# Patient Record
Sex: FEMALE | Race: WHITE | Employment: OTHER | URBAN - METROPOLITAN AREA
[De-identification: names, ages, dates, MRNs, and addresses within clinical notes are randomized per-mention and may not be internally consistent; named-entity substitution may affect disease eponyms.]

---

## 2020-10-02 DIAGNOSIS — Z11.59 ENCOUNTER FOR SCREENING FOR OTHER VIRAL DISEASES: Primary | ICD-10-CM

## 2020-10-18 DIAGNOSIS — Z11.59 ENCOUNTER FOR SCREENING FOR OTHER VIRAL DISEASES: ICD-10-CM

## 2020-10-18 PROCEDURE — U0003 INFECTIOUS AGENT DETECTION BY NUCLEIC ACID (DNA OR RNA); SEVERE ACUTE RESPIRATORY SYNDROME CORONAVIRUS 2 (SARS-COV-2) (CORONAVIRUS DISEASE [COVID-19]), AMPLIFIED PROBE TECHNIQUE, MAKING USE OF HIGH THROUGHPUT TECHNOLOGIES AS DESCRIBED BY CMS-2020-01-R: HCPCS | Performed by: ORTHOPAEDIC SURGERY

## 2020-10-19 LAB
SARS-COV-2 RNA SPEC QL NAA+PROBE: NOT DETECTED
SPECIMEN SOURCE: NORMAL

## 2020-10-20 RX ORDER — CHOLECALCIFEROL (VITAMIN D3) 50 MCG
1 TABLET ORAL EVERY MORNING
COMMUNITY

## 2020-10-20 RX ORDER — CARBIDOPA AND LEVODOPA 50; 200 MG/1; MG/1
1 TABLET, EXTENDED RELEASE ORAL AT BEDTIME
COMMUNITY

## 2020-10-20 RX ORDER — NAPROXEN SODIUM 220 MG
220 TABLET ORAL DAILY PRN
Status: ON HOLD | COMMUNITY
End: 2020-10-28

## 2020-10-20 RX ORDER — HYDROCODONE BITARTRATE AND ACETAMINOPHEN 5; 325 MG/1; MG/1
1-2 TABLET ORAL EVERY 6 HOURS PRN
Status: ON HOLD | COMMUNITY
End: 2020-10-21

## 2020-10-20 RX ORDER — PYRIDOXINE HCL (VITAMIN B6) 25 MG
25 TABLET ORAL EVERY MORNING
COMMUNITY

## 2020-10-20 RX ORDER — CARBIDOPA AND LEVODOPA 25; 100 MG/1; MG/1
2.5 TABLET ORAL 2 TIMES DAILY
COMMUNITY

## 2020-10-20 RX ORDER — ENTACAPONE 200 MG/1
200 TABLET ORAL 4 TIMES DAILY
COMMUNITY

## 2020-10-20 RX ORDER — HYDROCHLOROTHIAZIDE 12.5 MG/1
12.5 TABLET ORAL EVERY MORNING
COMMUNITY

## 2020-10-20 RX ORDER — ERGOCALCIFEROL 1.25 MG/1
50000 CAPSULE, LIQUID FILLED ORAL WEEKLY
COMMUNITY

## 2020-10-20 RX ORDER — CARBIDOPA AND LEVODOPA 25; 100 MG/1; MG/1
2 TABLET ORAL 2 TIMES DAILY
COMMUNITY

## 2020-10-20 RX ORDER — ROSUVASTATIN CALCIUM 20 MG/1
20 TABLET, COATED ORAL EVERY EVENING
COMMUNITY

## 2020-10-20 RX ORDER — GABAPENTIN 300 MG/1
300 CAPSULE ORAL 3 TIMES DAILY
COMMUNITY

## 2020-10-20 RX ORDER — ACETAMINOPHEN 500 MG
500-1000 TABLET ORAL EVERY 6 HOURS PRN
Status: ON HOLD | COMMUNITY
End: 2020-10-21

## 2020-10-20 RX ORDER — AMANTADINE 137 MG/1
2 CAPSULE, COATED PELLETS ORAL EVERY EVENING
Status: ON HOLD | COMMUNITY
End: 2020-10-21

## 2020-10-20 RX ORDER — MULTIVITAMIN,THERAPEUTIC
1 TABLET ORAL EVERY MORNING
COMMUNITY

## 2020-10-21 ENCOUNTER — ANESTHESIA (OUTPATIENT)
Dept: SURGERY | Facility: CLINIC | Age: 70
DRG: 908 | End: 2020-10-21
Payer: MEDICARE

## 2020-10-21 ENCOUNTER — HOSPITAL ENCOUNTER (OUTPATIENT)
Facility: CLINIC | Age: 70
Discharge: HOME | DRG: 908 | End: 2020-10-21
Attending: ORTHOPAEDIC SURGERY | Admitting: ORTHOPAEDIC SURGERY
Payer: MEDICARE

## 2020-10-21 ENCOUNTER — APPOINTMENT (OUTPATIENT)
Dept: GENERAL RADIOLOGY | Facility: CLINIC | Age: 70
DRG: 908 | End: 2020-10-21
Attending: ORTHOPAEDIC SURGERY
Payer: MEDICARE

## 2020-10-21 ENCOUNTER — ANESTHESIA EVENT (OUTPATIENT)
Dept: SURGERY | Facility: CLINIC | Age: 70
DRG: 908 | End: 2020-10-21
Payer: MEDICARE

## 2020-10-21 VITALS
RESPIRATION RATE: 20 BRPM | TEMPERATURE: 98 F | WEIGHT: 238.9 LBS | HEART RATE: 89 BPM | OXYGEN SATURATION: 95 % | SYSTOLIC BLOOD PRESSURE: 126 MMHG | HEIGHT: 62 IN | BODY MASS INDEX: 43.96 KG/M2 | DIASTOLIC BLOOD PRESSURE: 88 MMHG

## 2020-10-21 DIAGNOSIS — M54.16 LUMBAR RADICULOPATHY: Primary | ICD-10-CM

## 2020-10-21 LAB — POTASSIUM SERPL-SCNC: 4.2 MMOL/L (ref 3.4–5.3)

## 2020-10-21 PROCEDURE — 360N000030 HC SURGERY LEVEL 4 W FLUORO 1ST 30 MIN: Performed by: ORTHOPAEDIC SURGERY

## 2020-10-21 PROCEDURE — 761N000007 HC RECOVERY PHASE 2 EACH 15 MINS: Performed by: ORTHOPAEDIC SURGERY

## 2020-10-21 PROCEDURE — 84132 ASSAY OF SERUM POTASSIUM: CPT | Performed by: ANESTHESIOLOGY

## 2020-10-21 PROCEDURE — 999N000139 HC STATISTIC PRE-PROCEDURE ASSESSMENT II: Performed by: ORTHOPAEDIC SURGERY

## 2020-10-21 PROCEDURE — 761N000002 HC RECOVERY PHASE 1 LEVEL 1 EA ADDTL HR: Performed by: ORTHOPAEDIC SURGERY

## 2020-10-21 PROCEDURE — 999N000179 XR SURGERY CARM FLUORO LESS THAN 5 MIN W STILLS

## 2020-10-21 PROCEDURE — 370N000002 HC ANESTHESIA TECHNICAL FEE, EACH ADDTL 15 MIN: Performed by: ORTHOPAEDIC SURGERY

## 2020-10-21 PROCEDURE — 250N000009 HC RX 250

## 2020-10-21 PROCEDURE — 272N000001 HC OR GENERAL SUPPLY STERILE: Performed by: ORTHOPAEDIC SURGERY

## 2020-10-21 PROCEDURE — 250N000009 HC RX 250: Performed by: ORTHOPAEDIC SURGERY

## 2020-10-21 PROCEDURE — 250N000003 HC SEVOFLURANE, EA 15 MIN: Performed by: ORTHOPAEDIC SURGERY

## 2020-10-21 PROCEDURE — 250N000013 HC RX MED GY IP 250 OP 250 PS 637: Performed by: ORTHOPAEDIC SURGERY

## 2020-10-21 PROCEDURE — 761N000001 HC RECOVERY PHASE 1 LEVEL 1 FIRST HR: Performed by: ORTHOPAEDIC SURGERY

## 2020-10-21 PROCEDURE — 360N000027 HC SURGERY LEVEL 4 EA 15 ADDTL MIN: Performed by: ORTHOPAEDIC SURGERY

## 2020-10-21 PROCEDURE — 01NB0ZZ RELEASE LUMBAR NERVE, OPEN APPROACH: ICD-10-PCS | Performed by: ORTHOPAEDIC SURGERY

## 2020-10-21 PROCEDURE — 370N000001 HC ANESTHESIA TECHNICAL FEE, 1ST 30 MIN: Performed by: ORTHOPAEDIC SURGERY

## 2020-10-21 PROCEDURE — 250N000011 HC RX IP 250 OP 636

## 2020-10-21 PROCEDURE — 250N000011 HC RX IP 250 OP 636: Performed by: ORTHOPAEDIC SURGERY

## 2020-10-21 PROCEDURE — 36415 COLL VENOUS BLD VENIPUNCTURE: CPT | Performed by: ANESTHESIOLOGY

## 2020-10-21 PROCEDURE — 250N000006 HC OR RX SURGIFLO W/THROMBIN KIT 2ML 1991 OPNP: Performed by: ORTHOPAEDIC SURGERY

## 2020-10-21 PROCEDURE — 258N000003 HC RX IP 258 OP 636

## 2020-10-21 RX ORDER — FENTANYL CITRATE 50 UG/ML
INJECTION, SOLUTION INTRAMUSCULAR; INTRAVENOUS PRN
Status: DISCONTINUED | OUTPATIENT
Start: 2020-10-21 | End: 2020-10-21

## 2020-10-21 RX ORDER — LIDOCAINE HYDROCHLORIDE 20 MG/ML
INJECTION, SOLUTION INFILTRATION; PERINEURAL PRN
Status: DISCONTINUED | OUTPATIENT
Start: 2020-10-21 | End: 2020-10-21

## 2020-10-21 RX ORDER — ACETAMINOPHEN 325 MG/1
975 TABLET ORAL ONCE
Status: COMPLETED | OUTPATIENT
Start: 2020-10-21 | End: 2020-10-21

## 2020-10-21 RX ORDER — ONDANSETRON 4 MG/1
4 TABLET, ORALLY DISINTEGRATING ORAL
Status: DISCONTINUED | OUTPATIENT
Start: 2020-10-21 | End: 2020-10-21 | Stop reason: HOSPADM

## 2020-10-21 RX ORDER — BUPIVACAINE HYDROCHLORIDE AND EPINEPHRINE 5; 5 MG/ML; UG/ML
INJECTION, SOLUTION PERINEURAL PRN
Status: DISCONTINUED | OUTPATIENT
Start: 2020-10-21 | End: 2020-10-21 | Stop reason: HOSPADM

## 2020-10-21 RX ORDER — ONDANSETRON 4 MG/1
4 TABLET, ORALLY DISINTEGRATING ORAL EVERY 30 MIN PRN
Status: DISCONTINUED | OUTPATIENT
Start: 2020-10-21 | End: 2020-10-21 | Stop reason: HOSPADM

## 2020-10-21 RX ORDER — GLYCOPYRROLATE 0.2 MG/ML
INJECTION, SOLUTION INTRAMUSCULAR; INTRAVENOUS PRN
Status: DISCONTINUED | OUTPATIENT
Start: 2020-10-21 | End: 2020-10-21

## 2020-10-21 RX ORDER — MAGNESIUM HYDROXIDE 1200 MG/15ML
LIQUID ORAL PRN
Status: DISCONTINUED | OUTPATIENT
Start: 2020-10-21 | End: 2020-10-21 | Stop reason: HOSPADM

## 2020-10-21 RX ORDER — DEXAMETHASONE SODIUM PHOSPHATE 4 MG/ML
INJECTION, SOLUTION INTRA-ARTICULAR; INTRALESIONAL; INTRAMUSCULAR; INTRAVENOUS; SOFT TISSUE PRN
Status: DISCONTINUED | OUTPATIENT
Start: 2020-10-21 | End: 2020-10-21

## 2020-10-21 RX ORDER — EPHEDRINE SULFATE 50 MG/ML
INJECTION, SOLUTION INTRAMUSCULAR; INTRAVENOUS; SUBCUTANEOUS PRN
Status: DISCONTINUED | OUTPATIENT
Start: 2020-10-21 | End: 2020-10-21

## 2020-10-21 RX ORDER — OXYCODONE HYDROCHLORIDE 5 MG/1
10 TABLET ORAL
Status: COMPLETED | OUTPATIENT
Start: 2020-10-21 | End: 2020-10-21

## 2020-10-21 RX ORDER — SODIUM CHLORIDE, SODIUM LACTATE, POTASSIUM CHLORIDE, CALCIUM CHLORIDE 600; 310; 30; 20 MG/100ML; MG/100ML; MG/100ML; MG/100ML
INJECTION, SOLUTION INTRAVENOUS CONTINUOUS
Status: DISCONTINUED | OUTPATIENT
Start: 2020-10-21 | End: 2020-10-21 | Stop reason: HOSPADM

## 2020-10-21 RX ORDER — CEFAZOLIN SODIUM 2 G/100ML
2 INJECTION, SOLUTION INTRAVENOUS
Status: COMPLETED | OUTPATIENT
Start: 2020-10-21 | End: 2020-10-21

## 2020-10-21 RX ORDER — LIDOCAINE 40 MG/G
CREAM TOPICAL
Status: DISCONTINUED | OUTPATIENT
Start: 2020-10-21 | End: 2020-10-21 | Stop reason: HOSPADM

## 2020-10-21 RX ORDER — SODIUM CHLORIDE, SODIUM LACTATE, POTASSIUM CHLORIDE, CALCIUM CHLORIDE 600; 310; 30; 20 MG/100ML; MG/100ML; MG/100ML; MG/100ML
INJECTION, SOLUTION INTRAVENOUS CONTINUOUS PRN
Status: DISCONTINUED | OUTPATIENT
Start: 2020-10-21 | End: 2020-10-21

## 2020-10-21 RX ORDER — FENTANYL CITRATE 50 UG/ML
25-50 INJECTION, SOLUTION INTRAMUSCULAR; INTRAVENOUS
Status: DISCONTINUED | OUTPATIENT
Start: 2020-10-21 | End: 2020-10-21 | Stop reason: HOSPADM

## 2020-10-21 RX ORDER — PROPOFOL 10 MG/ML
INJECTION, EMULSION INTRAVENOUS CONTINUOUS PRN
Status: DISCONTINUED | OUTPATIENT
Start: 2020-10-21 | End: 2020-10-21

## 2020-10-21 RX ORDER — AMOXICILLIN 250 MG
1 CAPSULE ORAL 2 TIMES DAILY PRN
Qty: 28 TABLET | Refills: 0 | Status: SHIPPED | OUTPATIENT
Start: 2020-10-21 | End: 2020-11-04

## 2020-10-21 RX ORDER — HYDROMORPHONE HYDROCHLORIDE 1 MG/ML
.3-.5 INJECTION, SOLUTION INTRAMUSCULAR; INTRAVENOUS; SUBCUTANEOUS EVERY 5 MIN PRN
Status: DISCONTINUED | OUTPATIENT
Start: 2020-10-21 | End: 2020-10-21 | Stop reason: HOSPADM

## 2020-10-21 RX ORDER — ONDANSETRON 2 MG/ML
4 INJECTION INTRAMUSCULAR; INTRAVENOUS EVERY 30 MIN PRN
Status: DISCONTINUED | OUTPATIENT
Start: 2020-10-21 | End: 2020-10-21 | Stop reason: HOSPADM

## 2020-10-21 RX ORDER — AMANTADINE 137 MG/1
2 CAPSULE, COATED PELLETS ORAL AT BEDTIME
COMMUNITY

## 2020-10-21 RX ORDER — GABAPENTIN 100 MG/1
100 CAPSULE ORAL
Status: COMPLETED | OUTPATIENT
Start: 2020-10-21 | End: 2020-10-21

## 2020-10-21 RX ORDER — PROPOFOL 10 MG/ML
INJECTION, EMULSION INTRAVENOUS PRN
Status: DISCONTINUED | OUTPATIENT
Start: 2020-10-21 | End: 2020-10-21

## 2020-10-21 RX ORDER — NALOXONE HYDROCHLORIDE 0.4 MG/ML
.1-.4 INJECTION, SOLUTION INTRAMUSCULAR; INTRAVENOUS; SUBCUTANEOUS
Status: DISCONTINUED | OUTPATIENT
Start: 2020-10-21 | End: 2020-10-21 | Stop reason: HOSPADM

## 2020-10-21 RX ORDER — CEFAZOLIN SODIUM 1 G/3ML
1 INJECTION, POWDER, FOR SOLUTION INTRAMUSCULAR; INTRAVENOUS SEE ADMIN INSTRUCTIONS
Status: DISCONTINUED | OUTPATIENT
Start: 2020-10-21 | End: 2020-10-21 | Stop reason: HOSPADM

## 2020-10-21 RX ORDER — OXYCODONE HYDROCHLORIDE 5 MG/1
5-10 TABLET ORAL EVERY 4 HOURS PRN
Qty: 20 TABLET | Refills: 0 | Status: ON HOLD | OUTPATIENT
Start: 2020-10-21 | End: 2020-10-28

## 2020-10-21 RX ORDER — ACETAMINOPHEN 325 MG/1
650 TABLET ORAL
Status: DISCONTINUED | OUTPATIENT
Start: 2020-10-21 | End: 2020-10-21 | Stop reason: HOSPADM

## 2020-10-21 RX ADMIN — DEXAMETHASONE SODIUM PHOSPHATE 4 MG: 4 INJECTION, SOLUTION INTRA-ARTICULAR; INTRALESIONAL; INTRAMUSCULAR; INTRAVENOUS; SOFT TISSUE at 07:39

## 2020-10-21 RX ADMIN — Medication 12.5 MG: at 07:36

## 2020-10-21 RX ADMIN — PHENYLEPHRINE HYDROCHLORIDE 0.4 MCG: 10 INJECTION INTRAVENOUS at 08:06

## 2020-10-21 RX ADMIN — PHENYLEPHRINE HYDROCHLORIDE 200 MCG: 10 INJECTION INTRAVENOUS at 07:53

## 2020-10-21 RX ADMIN — FENTANYL CITRATE 50 MCG: 50 INJECTION, SOLUTION INTRAMUSCULAR; INTRAVENOUS at 07:23

## 2020-10-21 RX ADMIN — SUGAMMADEX 400 MG: 100 INJECTION, SOLUTION INTRAVENOUS at 08:47

## 2020-10-21 RX ADMIN — PHENYLEPHRINE HYDROCHLORIDE 100 MCG: 10 INJECTION INTRAVENOUS at 07:44

## 2020-10-21 RX ADMIN — GLYCOPYRROLATE 0.2 MG: 0.2 INJECTION, SOLUTION INTRAMUSCULAR; INTRAVENOUS at 08:25

## 2020-10-21 RX ADMIN — HYDROMORPHONE HYDROCHLORIDE 0.5 MG: 1 INJECTION, SOLUTION INTRAMUSCULAR; INTRAVENOUS; SUBCUTANEOUS at 08:12

## 2020-10-21 RX ADMIN — PROPOFOL 200 MG: 10 INJECTION, EMULSION INTRAVENOUS at 07:23

## 2020-10-21 RX ADMIN — SODIUM CHLORIDE, POTASSIUM CHLORIDE, SODIUM LACTATE AND CALCIUM CHLORIDE: 600; 310; 30; 20 INJECTION, SOLUTION INTRAVENOUS at 07:16

## 2020-10-21 RX ADMIN — CEFAZOLIN SODIUM 2 G: 2 INJECTION, SOLUTION INTRAVENOUS at 07:30

## 2020-10-21 RX ADMIN — ROCURONIUM BROMIDE 50 MG: 10 INJECTION INTRAVENOUS at 07:23

## 2020-10-21 RX ADMIN — PHENYLEPHRINE HYDROCHLORIDE 200 MCG: 10 INJECTION INTRAVENOUS at 08:07

## 2020-10-21 RX ADMIN — GABAPENTIN 100 MG: 100 CAPSULE ORAL at 06:24

## 2020-10-21 RX ADMIN — OXYCODONE HYDROCHLORIDE 5 MG: 5 TABLET ORAL at 10:06

## 2020-10-21 RX ADMIN — ACETAMINOPHEN 975 MG: 325 TABLET, FILM COATED ORAL at 06:24

## 2020-10-21 RX ADMIN — LIDOCAINE HYDROCHLORIDE 60 MG: 20 INJECTION, SOLUTION INFILTRATION; PERINEURAL at 07:23

## 2020-10-21 RX ADMIN — MIDAZOLAM 2 MG: 1 INJECTION INTRAMUSCULAR; INTRAVENOUS at 07:18

## 2020-10-21 RX ADMIN — PROPOFOL 30 MCG/KG/MIN: 10 INJECTION, EMULSION INTRAVENOUS at 07:29

## 2020-10-21 RX ADMIN — ROCURONIUM BROMIDE 10 MG: 10 INJECTION INTRAVENOUS at 08:30

## 2020-10-21 RX ADMIN — PHENYLEPHRINE HYDROCHLORIDE 100 MCG: 10 INJECTION INTRAVENOUS at 07:36

## 2020-10-21 RX ADMIN — ROCURONIUM BROMIDE 10 MG: 10 INJECTION INTRAVENOUS at 08:09

## 2020-10-21 SDOH — HEALTH STABILITY: MENTAL HEALTH: HOW OFTEN DO YOU HAVE A DRINK CONTAINING ALCOHOL?: NEVER

## 2020-10-21 ASSESSMENT — MIFFLIN-ST. JEOR: SCORE: 1566.89

## 2020-10-21 ASSESSMENT — COPD QUESTIONNAIRES: COPD: 0

## 2020-10-21 ASSESSMENT — ENCOUNTER SYMPTOMS: SEIZURES: 0

## 2020-10-21 NOTE — ANESTHESIA PREPROCEDURE EVALUATION
Anesthesia Pre-Procedure Evaluation    Patient: Teagan Rand   MRN: [id number] : 1950          Preoperative Diagnosis: Spondylolisthesis of lumbar region [M43.16]  Other spondylosis with radiculopathy, lumbar region [M47.26]  Stenosis, spinal, lumbar [M48.061]    Procedure(s):  RIGHT LUMBAR 4-5 LAMINOTOMY    Past Medical History:   Diagnosis Date     Anxiety      Benign paroxysmal positional vertigo      Blood in stool      Dysfunction of both eustachian tubes      Dyskinesia      Hair loss      Hyperlipidemia      Hypertension      Imbalance      Livedo reticularis      Mandibular swelling      Muscle cramps      Obese      Onychomycosis     toenail     Osteoporosis      Parkinson's disease (H)      Polyclonal gammopathy      Sacroiliitis (H)      Sensorineural hearing loss      Sinusitis      Sleep apnea     doesn't use cpap     Thyroid disorder      Tongue symptom      Vitamin B6 deficiency      Vitamin D deficiency      Past Surgical History:   Procedure Laterality Date     APPENDECTOMY       CHOLECYSTECTOMY       COLONOSCOPY       GYN SURGERY      hysterectomy     ORTHOPEDIC SURGERY      bilateral knee arthroscopy meniscus repair       Anesthesia Evaluation     .             ROS/MED HX    ENT/Pulmonary:     (+)sleep apnea, doesn't use CPAP , . .   (-) asthma and COPD   Neurologic:     (+)Parkinson's disease    (-) seizures and CVA   Cardiovascular:     (+) Dyslipidemia, hypertension----. : . . . :. .       METS/Exercise Tolerance:     Hematologic:         Musculoskeletal:         GI/Hepatic:         Renal/Genitourinary:         Endo:     (+) thyroid problem Obesity, .      Psychiatric:     (+) psychiatric history anxiety      Infectious Disease:         Malignancy:         Other:                          Physical Exam      Airway   Mallampati: III  TM distance: >3 FB  Neck ROM: full    Dental   (+) caps    Cardiovascular   Rhythm and rate: regular      Pulmonary    breath sounds clear to  "auscultation            Lab Results   Component Value Date    POTASSIUM 4.2 10/21/2020       Preop Vitals  BP Readings from Last 3 Encounters:   10/21/20 (!) 162/80    Pulse Readings from Last 3 Encounters:   No data found for Pulse      Resp Readings from Last 3 Encounters:   10/21/20 16    SpO2 Readings from Last 3 Encounters:   10/21/20 95%      Temp Readings from Last 1 Encounters:   10/21/20 36.3  C (97.4  F) (Oral)    Ht Readings from Last 1 Encounters:   10/21/20 1.575 m (5' 2\")      Wt Readings from Last 1 Encounters:   10/21/20 108.4 kg (238 lb 14.4 oz)    Estimated body mass index is 43.7 kg/m  as calculated from the following:    Height as of this encounter: 1.575 m (5' 2\").    Weight as of this encounter: 108.4 kg (238 lb 14.4 oz).       Anesthesia Plan      History & Physical Review  History and physical reviewed and following examination; no interval change.    ASA Status:  2 .    NPO Status:  > 8 hours    Plan for General   PONV prophylaxis:  Ondansetron (or other 5HT-3) and Dexamethasone or Solumedrol  Additional equipment: Videolaryngoscope        Postoperative Care      Consents  Anesthetic plan, risks, benefits and alternatives discussed with:  Patient..                 Kika Ferrera"

## 2020-10-21 NOTE — OP NOTE
Orthopedic Surgery Operative Report    Patient:   Teagan Rand  MRN:   [id number]   :  1950  Facility: [facility]   Date:  10/21/2020        PREOPERATIVE DIAGNOSIS:    Right L4-5 lateral recess stenosis with L5 radiculopathy    POSTOPERATIVE DIAGNOSIS:    Right L4-5 lateral recess stenosis with L5 radiculopathy    PROCEDURE PERFORMED:    1. Right L4-5 hemilaminotomy  2. Use of operating microscope    SURGEON:    Jose Lr MD    SURGICAL ASSISTANT:    None    ANESTHESIA:  General    FINDINGS:    Severe right L4-5 facet overgrowth with subarticular stenosis.  Multiple adhesions of the ligamenum flavum to the underlying dura    COMPLICATIONS:     None    SPECIMENS:    None    ESTIMATED BLOOD LOSS:  5 cc    IMPLANTS:    None    INDICATION FOR OPERATION:  The patient is a 70-year-old female with a history of Parkinson's disease who recently developed a severe right L5 radiculopathy in the setting of a spondylolisthesis L4-5.  I discussed with her the risks benefits and alternatives of the above procedure as a means to decompress the L5 nerve root and improve her symptoms.  I did notice that in the setting of spondylolisthesis there is the possibility of inducing instability at that site which could ultimately go on to need a fusion, however I did think that there is a reasonable chance of success with a decompression operation alone, which the patient wished to proceed with.    DESCRIPTION OF PROCEDURE:    The patient was met in the preoperative holding area and the operative site was confirmed and marked.  We once again reviewed the risks, benefits, and alternatives of the operation and the patient wished to proceed with the surgery.    The patient was taken back to the operating room and induced under general anesthesia.  The patient was positioned prone on the Reynaldo frame with all bony prominences well padded.  The surgical site was prepped and draped in the usual standard  fashion.    A spinal needle was used for level localization with fluoroscopy.  Once I had localized the appropriate skin incision site for the approach to the operative level, a skin incision was made and dissection carried down to the myofascia.  I created a small opening within the myofascia.  I next introduced the first dilator from the IQcardRTRIRIGA system through this hole in the myofascia, and guided it down to lamina.  I sequentially dilated up to a 22 mm tubular retractor.  I locked the tubular retractor into place, and then once again confirmed radiographically that it was docked at the right L4-5 level.  I cleared off the remaining soft tissue at the base of the dilator, and established my operative landmarks of the spinous process and inferior edge of the cranial lamina, and facet complex laterally.     I next performed a hemilaminotomy using the Midas brisa and Kerrison rongeurs, undercutting the spinous process to decompress centrally, and performed an ipsilateral partial medial facetectomy while taking care to preserve enough pars and facet to minimize the risk of postoperative fracture.  I released the ligamenum flavum from the undersurface of the cranial and caudal lamina, and then the facet ipsilaterally.  I removed most of the detached ligamentum flavum, though there were several sites of adhesions of the ligamentum flavum to the underlying dura around the axilla of the L5 nerve root, which I debulked but did not attempt to remove from the dura as it was not compressive and increased the risk of a dural tear should I have proceeded with removal.  I inspected the neural elements, and resected additional bone from the lateral recess as necessary to ensure complete decompression of the traversing L5 nerve root, following its course until it exited into the foramen below the L5 pedicle.  I used a [place] to feel into the foramen and confirmed there was adequate decompression.    I achieved final  hemostasis of the deep structures with bipolar cautery and Floseal.  No ongoing sites of bleeding were present.  At this point I began to begin to withdraw my tubular retractor very slowly through each tissue layer, taking care to bipolar all active sites of bleeding as I withdrew.  When the tube was removed, there were no remaining active sites of bleeding present.    The deep fascia was closed with a running 0-vicyl suture in a watertight fashion, and the subcutaneous tissues were closed with 2-0 vicryl interrupted sutures.  Dermabond and steri strips were applied.  The patient was transferred off of the operative table and allowed to emerge from anesthesia.  The patient was extubated and transported to PACU in stable condition.     All sponge and needle counts were correct at case conclusion.      POSTOPERATIVE PLAN:  -Activity:    Up with assist  -Weight Bearing Status:  WBAT   -Bracing:   None  -Anticoagulation:   Mobilization only  -Pain control:    PO Rx  -Dressing:    Ok to shower with dressing in place, dressing ok to get wet.  -Diet:     ADAT    -Disposition:    Home from PACU pending medical stability  -Follow up:     2 weeks in my clinic        Jose Lr MD

## 2020-10-21 NOTE — OR NURSING
5mg of oxycodone was given. IV left in place. PNDS met, po per I&O sheet. Pt dressed, up in recliner and transported to Phase 2. Patient continues to work on IS. Patient does have  History of SA but does not use CPAP.

## 2020-10-21 NOTE — ANESTHESIA CARE TRANSFER NOTE
Patient: Teagan Rand    Procedure(s):  RIGHT LUMBAR 4-5 LAMINOTOMY    Diagnosis: Spondylolisthesis of lumbar region [M43.16]  Other spondylosis with radiculopathy, lumbar region [M47.26]  Stenosis, spinal, lumbar [M48.061]  Diagnosis Additional Information: No value filed.    Anesthesia Type:   General     Note:  Airway :Face Mask  Patient transferred to:PACU  Comments: VSS HOB UP 35 DEGREES REPORT GIVEN CARE TRANSFERREDHandoff Report: Identifed the Patient, Identified the Reponsible Provider, Reviewed the pertinent medical history, Discussed the surgical course, Reviewed Intra-OP anesthesia mangement and issues during anesthesia, Set expectations for post-procedure period and Allowed opportunity for questions and acknowledgement of understanding      Vitals: (Last set prior to Anesthesia Care Transfer)    CRNA VITALS  10/21/2020 0832 - 10/21/2020 0902      10/21/2020             Resp Rate (observed):  (!) 1    Resp Rate (set):  10                Electronically Signed By: IRMA Vergara CRNA  October 21, 2020  9:02 AM

## 2020-10-21 NOTE — ANESTHESIA PROCEDURE NOTES
Airway   Date/Time: 10/21/2020 7:27 AM   Patient location during procedure: OR    Staff -   Other Anesthesia Staff: Livan Wilson  Performed By: SREE    Indications and Patient Condition  Indications for airway management: lorena-procedural  Induction type:intravenousMask difficulty assessment: 1 - vent by mask    Final Airway Details  Final airway type: endotracheal airway  Successful airway:ETT - single  Endotracheal Airway Details   ETT size (mm): 7.0  Cuffed: yes  Successful intubation technique: video laryngoscopy  Grade View of Cords: 4  Adjucts: stylet  Measured from: lips  Secured at (cm): 24  Secured with: pink tape  Bite block used: Soft    Post intubation assessment   Placement verified by: capnometry, equal breath sounds and chest rise   Number of attempts at approach: 1  Secured with:pink tape  Ease of procedure: easy  Dentition: Intact and Unchanged

## 2020-10-21 NOTE — DISCHARGE INSTRUCTIONS
Today you were given 975 mg of Tylenol at 6:30 am. The recommended daily maximum dose is 4000 mg.         Care after a Discectomy/Decompression/Laminectomy - Dr. Jose Lr     The following information will help you through your recovery at home.     Pain   - It is normal for you to experience some pain in the area of your incision after your surgery.  Some of your leg pain may go away immediately after your surgery.  Some of the pain will gradually decrease over the next few days or weeks depending on the amount of inflammation present in the nerve.       - Sometimes Dr. Lr will place a steroid preparation on the nerve during surgery.   This may help decrease the nerve inflammation for the first few days after surgery.  If some of your leg pain returns 3 to 5 days after surgery it may be due to the steroid wearing off.  The pain should not be as bad as your pre-op pain and will diminish over a period of weeks.       - You should call Dr. Lr's office if your leg pain returns suddenly and does not improve over 24 hours.       Activity   - You may increase your activity as tolerated; walking is the best form of exercise after spine surgery.     - For six weeks after surgery avoid bending, lifting, and twisting (BLT).  Avoid activities such as vacuuming, raking and shoveling.   - Try to limit your lifting to 10 lbs during the first 2 weeks and then increase to 20-25lbs over the next 6 weeks.   - You may return to work approximately 1- 2 weeks after your surgery if you have a sedentary or desk type job.  If you have a physical job Dr. Lr and his staff will help you determine a return to work plan.     - You may resume sexual activity when you feel ready.  Stop if you have pain.     Driving   - You may drive if you feel strong enough and are not taking any narcotic pain medications.     Incision Site   - It is ok to shower starting the day after surgery.  You may allow the dressing to get wet.  Do not immerse the  incision in water such as (bath, pool, hot tub) for at least 3 weeks.  Do not scrub the incision site while in the shower.     - Your incision is covered with steri-strips (narrow white tapes); they will get loose and fall off in 10-20 days.  If they are still in place 3 weeks after surgery, you may remove them.         Pain Management   - Take your prescribed pain medication as needed and directed.  Use Tylenol and Ibuprofen for your discomfort when you no longer need the narcotic pain medication.     - If you need a refill on your pain medication call [phone], please allow 24 hours for your prescription to be refilled, Dr. Lr's office does not refill pain medications on Friday afternoons.     Diet   - Eat a healthy diet; this will help your recovery.   - Drink plenty of fluids, water, milk or juice.   - If you have trouble with constipation you should eat more fiber, drink more fluids, increase your walking or try an over the counter laxative.     Follow-up Visits   - You should have a post-op appointment that was scheduled for you at the same time your surgery was scheduled. If you do not, please call Dr. Lr's office when you get home from your surgery.   - Write down any questions you have about your surgery, recovery, return to work and other topics you wished to be covered at your post-op visit.  This way, we will be able to address all of your questions at your next visit.   - Call Dr. Lr's office if you have any questions or concerns.     When to Call your Doctor:   - If you have any redness, warmth or swelling at the incision site.   - If your incision opens up.   - If you have increasing drainage from your incision.   - If you have a temperature greater than 100.5 degrees Fahrenheit.          Same Day Surgery Discharge Instructions for  Sedation and General Anesthesia       It's not unusual to feel dizzy, light-headed or faint for up to 24 hours after surgery or while taking pain medication.  If  you have these symptoms: sit for a few minutes before standing and have someone assist you when you get up to walk or use the bathroom.      You should rest and relax for the next 24 hours. We recommend you make arrangements to have an adult stay with you for at least 24 hours after your discharge.  Avoid hazardous and strenuous activity.      DO NOT DRIVE any vehicle or operate mechanical equipment for 24 hours following the end of your surgery.  Even though you may feel normal, your reactions may be affected by the medication you have received.      Do not drink alcoholic beverages for 24 hours following surgery.       Slowly progress to your regular diet as you feel able. It's not unusual to feel nauseated and/or vomit after receiving anesthesia.  If you develop these symptoms, drink clear liquids (apple juice, ginger ale, broth, 7-up, etc. ) until you feel better.  If your nausea and vomiting persists for 24 hours, please notify your surgeon.        All narcotic pain medications, along with inactivity and anesthesia, can cause constipation. Drinking plenty of liquids and increasing fiber intake will help.      For any questions of a medical nature, call your surgeon.      Do not make important decisions for 24 hours.      If you had general anesthesia, you may have a sore throat for a couple of days related to the breathing tube used during surgery.  You may use Cepacol lozenges to help with this discomfort.  If it worsens or if you develop a fever, contact your surgeon.       If you feel your pain is not well managed with the pain medications prescribed by your surgeon, please contact your surgeon's office to let them know so they can address your concerns.           CoVid 19 Information    We want to give you information regarding Covid. Please consult your primary care provider with any questions you might have.     Patient who have symptoms (cough, fever, or shortness of breath), need to isolate for 7 days  from when symptoms started OR 72 hours after fever resolves (without fever reducing medications) AND improvement of respiratory symptoms (whichever is longer).      Isolate yourself at home (in own room/own bathroom if possible)    Do Not allow any visitors    Do Not go to work or school    Do Not go to [employer],  centers, shopping, or other public places.    Do Not shake hands.    Avoid close and intimate contact with others (hugging, kissing).    Follow CDC recommendations for household cleaning of frequently touched services.     After the initial 7 days, continue to isolate yourself from household members as much as possible. To continue decrease the risk of community spread and exposure, you and any members of your household should limit activities in public for 14 days after starting home isolation.     You can reference the following CDC link for helpful home isolation/care tips:  https://www.cdc.gov/coronavirus/2019-ncov/downloads/10Things.pdf    Protect Others:    Cover Your Mouth and Nose with a mask, disposable tissue or wash cloth to avoid spreading germs to others.    Wash your hands and face frequently with soap and water    Call Your Primary Doctor If: Breathing difficulty develops or you become worse.    For more information about COVID19 and options for caring for yourself at home, please visit the CDC website at https://www.cdc.gov/coronavirus/2019-ncov/about/steps-when-sick.html  For more options for care at [facility], please visit our website at [email or web address]        ** If you have questions or concerns about your procedure, call   Dr. Lr at [phone].**

## 2020-10-21 NOTE — ANESTHESIA POSTPROCEDURE EVALUATION
Patient: Teagan Rand    Procedure(s):  RIGHT LUMBAR 4-5 LAMINOTOMY    Diagnosis:Spondylolisthesis of lumbar region [M43.16]  Other spondylosis with radiculopathy, lumbar region [M47.26]  Stenosis, spinal, lumbar [M48.061]  Diagnosis Additional Information: No value filed.    Anesthesia Type:  General    Note:  Anesthesia Post Evaluation    Patient location during evaluation: PACU  Patient participation: Able to fully participate in evaluation  Level of consciousness: awake, awake and alert and responsive to verbal stimuli  Pain management: adequate  Airway patency: patent  Cardiovascular status: acceptable  Respiratory status: acceptable  Hydration status: acceptable  PONV: none     Anesthetic complications: None          Last vitals:  Vitals:    10/21/20 1015 10/21/20 1030 10/21/20 1058   BP: 130/73  126/88   Pulse: 85 83 89   Resp: (!) 7 21 20   Temp:   36.7  C (98  F)   SpO2: 96%  95%         Electronically Signed By: Kika Ferrera  October 21, 2020  2:35 PM

## 2020-10-24 ENCOUNTER — ANESTHESIA (OUTPATIENT)
Dept: SURGERY | Facility: CLINIC | Age: 70
DRG: 908 | End: 2020-10-24
Payer: MEDICARE

## 2020-10-24 ENCOUNTER — ANESTHESIA EVENT (OUTPATIENT)
Dept: SURGERY | Facility: CLINIC | Age: 70
DRG: 908 | End: 2020-10-24
Payer: MEDICARE

## 2020-10-24 ENCOUNTER — HOSPITAL ENCOUNTER (INPATIENT)
Facility: CLINIC | Age: 70
LOS: 4 days | Discharge: NURSING, REHAB OR LONG-TERM CARE | DRG: 908 | End: 2020-10-28
Attending: EMERGENCY MEDICINE | Admitting: ORTHOPAEDIC SURGERY
Payer: MEDICARE

## 2020-10-24 ENCOUNTER — APPOINTMENT (OUTPATIENT)
Dept: MRI IMAGING | Facility: CLINIC | Age: 70
DRG: 908 | End: 2020-10-24
Attending: EMERGENCY MEDICINE
Payer: MEDICARE

## 2020-10-24 DIAGNOSIS — S06.4XAA EPIDURAL HEMATOMA (H): Primary | ICD-10-CM

## 2020-10-24 LAB
ANION GAP SERPL CALCULATED.3IONS-SCNC: 4 MMOL/L (ref 3–14)
APTT PPP: 28 SEC (ref 22–37)
BASOPHILS # BLD AUTO: 0 10E9/L (ref 0–0.2)
BASOPHILS NFR BLD AUTO: 0.2 %
BUN SERPL-MCNC: 17 MG/DL (ref 7–30)
CALCIUM SERPL-MCNC: 9.4 MG/DL (ref 8.5–10.1)
CHLORIDE SERPL-SCNC: 106 MMOL/L (ref 94–109)
CO2 SERPL-SCNC: 26 MMOL/L (ref 20–32)
CREAT SERPL-MCNC: 0.79 MG/DL (ref 0.52–1.04)
DIFFERENTIAL METHOD BLD: NORMAL
EOSINOPHIL # BLD AUTO: 0.1 10E9/L (ref 0–0.7)
EOSINOPHIL NFR BLD AUTO: 1 %
ERYTHROCYTE [DISTWIDTH] IN BLOOD BY AUTOMATED COUNT: 12.7 % (ref 10–15)
ERYTHROCYTE [DISTWIDTH] IN BLOOD BY AUTOMATED COUNT: 12.9 % (ref 10–15)
FIBRINOGEN PPP-MCNC: 418 MG/DL (ref 200–420)
GFR SERPL CREATININE-BSD FRML MDRD: 77 ML/MIN/{1.73_M2}
GLUCOSE SERPL-MCNC: 111 MG/DL (ref 70–99)
HCT VFR BLD AUTO: 37.2 % (ref 35–47)
HCT VFR BLD AUTO: 40.4 % (ref 35–47)
HGB BLD-MCNC: 12.6 G/DL (ref 11.7–15.7)
HGB BLD-MCNC: 13.7 G/DL (ref 11.7–15.7)
IMM GRANULOCYTES # BLD: 0 10E9/L (ref 0–0.4)
IMM GRANULOCYTES NFR BLD: 0.3 %
INR PPP: 0.98 (ref 0.86–1.14)
INR PPP: 1.03 (ref 0.86–1.14)
LABORATORY COMMENT REPORT: NORMAL
LYMPHOCYTES # BLD AUTO: 1.8 10E9/L (ref 0.8–5.3)
LYMPHOCYTES NFR BLD AUTO: 19.3 %
MCH RBC QN AUTO: 32.1 PG (ref 26.5–33)
MCH RBC QN AUTO: 32.2 PG (ref 26.5–33)
MCHC RBC AUTO-ENTMCNC: 33.9 G/DL (ref 31.5–36.5)
MCHC RBC AUTO-ENTMCNC: 33.9 G/DL (ref 31.5–36.5)
MCV RBC AUTO: 95 FL (ref 78–100)
MCV RBC AUTO: 95 FL (ref 78–100)
MONOCYTES # BLD AUTO: 0.8 10E9/L (ref 0–1.3)
MONOCYTES NFR BLD AUTO: 8.7 %
NEUTROPHILS # BLD AUTO: 6.6 10E9/L (ref 1.6–8.3)
NEUTROPHILS NFR BLD AUTO: 70.5 %
NRBC # BLD AUTO: 0 10*3/UL
NRBC BLD AUTO-RTO: 0 /100
PLATELET # BLD AUTO: 206 10E9/L (ref 150–450)
PLATELET # BLD AUTO: 210 10E9/L (ref 150–450)
POTASSIUM SERPL-SCNC: 4.2 MMOL/L (ref 3.4–5.3)
RBC # BLD AUTO: 3.92 10E12/L (ref 3.8–5.2)
RBC # BLD AUTO: 4.25 10E12/L (ref 3.8–5.2)
SARS-COV-2 RNA SPEC QL NAA+PROBE: NEGATIVE
SARS-COV-2 RNA SPEC QL NAA+PROBE: NORMAL
SODIUM SERPL-SCNC: 136 MMOL/L (ref 133–144)
SPECIMEN SOURCE: NORMAL
SPECIMEN SOURCE: NORMAL
WBC # BLD AUTO: 7.3 10E9/L (ref 4–11)
WBC # BLD AUTO: 9.3 10E9/L (ref 4–11)

## 2020-10-24 PROCEDURE — 80329 ANALGESICS NON-OPIOID 1 OR 2: CPT | Performed by: PHYSICIAN ASSISTANT

## 2020-10-24 PROCEDURE — 250N000011 HC RX IP 250 OP 636: Performed by: NURSE ANESTHETIST, CERTIFIED REGISTERED

## 2020-10-24 PROCEDURE — 250N000009 HC RX 250: Performed by: NURSE ANESTHETIST, CERTIFIED REGISTERED

## 2020-10-24 PROCEDURE — 99222 1ST HOSP IP/OBS MODERATE 55: CPT | Performed by: PHYSICIAN ASSISTANT

## 2020-10-24 PROCEDURE — 80048 BASIC METABOLIC PNL TOTAL CA: CPT | Performed by: EMERGENCY MEDICINE

## 2020-10-24 PROCEDURE — 250N000011 HC RX IP 250 OP 636: Performed by: ANESTHESIOLOGY

## 2020-10-24 PROCEDURE — 360N000027 HC SURGERY LEVEL 4 EA 15 ADDTL MIN: Performed by: ORTHOPAEDIC SURGERY

## 2020-10-24 PROCEDURE — 250N000009 HC RX 250: Performed by: ORTHOPAEDIC SURGERY

## 2020-10-24 PROCEDURE — 250N000003 HC SEVOFLURANE, EA 15 MIN: Performed by: ORTHOPAEDIC SURGERY

## 2020-10-24 PROCEDURE — U0003 INFECTIOUS AGENT DETECTION BY NUCLEIC ACID (DNA OR RNA); SEVERE ACUTE RESPIRATORY SYNDROME CORONAVIRUS 2 (SARS-COV-2) (CORONAVIRUS DISEASE [COVID-19]), AMPLIFIED PROBE TECHNIQUE, MAKING USE OF HIGH THROUGHPUT TECHNOLOGIES AS DESCRIBED BY CMS-2020-01-R: HCPCS | Performed by: EMERGENCY MEDICINE

## 2020-10-24 PROCEDURE — 72148 MRI LUMBAR SPINE W/O DYE: CPT

## 2020-10-24 PROCEDURE — 258N000003 HC RX IP 258 OP 636: Performed by: ANESTHESIOLOGY

## 2020-10-24 PROCEDURE — 761N000002 HC RECOVERY PHASE 1 LEVEL 1 EA ADDTL HR: Performed by: ORTHOPAEDIC SURGERY

## 2020-10-24 PROCEDURE — 370N000001 HC ANESTHESIA TECHNICAL FEE, 1ST 30 MIN: Performed by: ORTHOPAEDIC SURGERY

## 2020-10-24 PROCEDURE — 99207 PR CONSULT E&M CHANGED TO INITIAL LEVEL: CPT | Performed by: PHYSICIAN ASSISTANT

## 2020-10-24 PROCEDURE — 250N000013 HC RX MED GY IP 250 OP 250 PS 637: Performed by: ORTHOPAEDIC SURGERY

## 2020-10-24 PROCEDURE — 01NB0ZZ RELEASE LUMBAR NERVE, OPEN APPROACH: ICD-10-PCS | Performed by: ORTHOPAEDIC SURGERY

## 2020-10-24 PROCEDURE — 250N000011 HC RX IP 250 OP 636: Performed by: ORTHOPAEDIC SURGERY

## 2020-10-24 PROCEDURE — 96376 TX/PRO/DX INJ SAME DRUG ADON: CPT

## 2020-10-24 PROCEDURE — 00UT0KZ SUPPLEMENT SPINAL MENINGES WITH NONAUTOLOGOUS TISSUE SUBSTITUTE, OPEN APPROACH: ICD-10-PCS | Performed by: ORTHOPAEDIC SURGERY

## 2020-10-24 PROCEDURE — 85730 THROMBOPLASTIN TIME PARTIAL: CPT | Performed by: ANESTHESIOLOGY

## 2020-10-24 PROCEDURE — 85610 PROTHROMBIN TIME: CPT | Performed by: ANESTHESIOLOGY

## 2020-10-24 PROCEDURE — 250N000011 HC RX IP 250 OP 636: Performed by: EMERGENCY MEDICINE

## 2020-10-24 PROCEDURE — C9803 HOPD COVID-19 SPEC COLLECT: HCPCS

## 2020-10-24 PROCEDURE — 36415 COLL VENOUS BLD VENIPUNCTURE: CPT | Performed by: PHYSICIAN ASSISTANT

## 2020-10-24 PROCEDURE — 96374 THER/PROPH/DIAG INJ IV PUSH: CPT | Mod: 59

## 2020-10-24 PROCEDURE — 99285 EMERGENCY DEPT VISIT HI MDM: CPT | Mod: 25

## 2020-10-24 PROCEDURE — 85025 COMPLETE CBC W/AUTO DIFF WBC: CPT | Performed by: EMERGENCY MEDICINE

## 2020-10-24 PROCEDURE — 85610 PROTHROMBIN TIME: CPT | Performed by: EMERGENCY MEDICINE

## 2020-10-24 PROCEDURE — 258N000003 HC RX IP 258 OP 636: Performed by: ORTHOPAEDIC SURGERY

## 2020-10-24 PROCEDURE — 85384 FIBRINOGEN ACTIVITY: CPT | Performed by: ANESTHESIOLOGY

## 2020-10-24 PROCEDURE — 250N000013 HC RX MED GY IP 250 OP 250 PS 637: Performed by: ANESTHESIOLOGY

## 2020-10-24 PROCEDURE — C1763 CONN TISS, NON-HUMAN: HCPCS | Performed by: ORTHOPAEDIC SURGERY

## 2020-10-24 PROCEDURE — 272N000001 HC OR GENERAL SUPPLY STERILE: Performed by: ORTHOPAEDIC SURGERY

## 2020-10-24 PROCEDURE — 85027 COMPLETE CBC AUTOMATED: CPT | Performed by: ANESTHESIOLOGY

## 2020-10-24 PROCEDURE — 258N000003 HC RX IP 258 OP 636: Performed by: NURSE ANESTHETIST, CERTIFIED REGISTERED

## 2020-10-24 PROCEDURE — 999N000139 HC STATISTIC PRE-PROCEDURE ASSESSMENT II: Performed by: ORTHOPAEDIC SURGERY

## 2020-10-24 PROCEDURE — 761N000001 HC RECOVERY PHASE 1 LEVEL 1 FIRST HR: Performed by: ORTHOPAEDIC SURGERY

## 2020-10-24 PROCEDURE — 120N000001 HC R&B MED SURG/OB

## 2020-10-24 PROCEDURE — 250N000006 HC OR RX SURGIFLO W/THROMBIN KIT 2ML 1991 OPNP: Performed by: ORTHOPAEDIC SURGERY

## 2020-10-24 PROCEDURE — 360N000030 HC SURGERY LEVEL 4 W FLUORO 1ST 30 MIN: Performed by: ORTHOPAEDIC SURGERY

## 2020-10-24 PROCEDURE — 370N000002 HC ANESTHESIA TECHNICAL FEE, EACH ADDTL 15 MIN: Performed by: ORTHOPAEDIC SURGERY

## 2020-10-24 PROCEDURE — 96375 TX/PRO/DX INJ NEW DRUG ADDON: CPT

## 2020-10-24 PROCEDURE — 009U0ZZ DRAINAGE OF SPINAL CANAL, OPEN APPROACH: ICD-10-PCS | Performed by: ORTHOPAEDIC SURGERY

## 2020-10-24 DEVICE — GRAFT DURAGEN 2X2" ID220: Type: IMPLANTABLE DEVICE | Site: SPINE LUMBAR | Status: FUNCTIONAL

## 2020-10-24 RX ORDER — ACETAMINOPHEN 325 MG/1
650 TABLET ORAL EVERY 4 HOURS PRN
Status: DISCONTINUED | OUTPATIENT
Start: 2020-10-27 | End: 2020-10-28 | Stop reason: HOSPADM

## 2020-10-24 RX ORDER — PROCHLORPERAZINE MALEATE 5 MG
5 TABLET ORAL EVERY 6 HOURS PRN
Status: DISCONTINUED | OUTPATIENT
Start: 2020-10-24 | End: 2020-10-24

## 2020-10-24 RX ORDER — LIDOCAINE HYDROCHLORIDE 20 MG/ML
INJECTION, SOLUTION INFILTRATION; PERINEURAL PRN
Status: DISCONTINUED | OUTPATIENT
Start: 2020-10-24 | End: 2020-10-24

## 2020-10-24 RX ORDER — PYRIDOXINE HCL (VITAMIN B6) 25 MG
25 TABLET ORAL EVERY MORNING
Status: DISCONTINUED | OUTPATIENT
Start: 2020-10-25 | End: 2020-10-28 | Stop reason: HOSPADM

## 2020-10-24 RX ORDER — NALOXONE HYDROCHLORIDE 0.4 MG/ML
.1-.4 INJECTION, SOLUTION INTRAMUSCULAR; INTRAVENOUS; SUBCUTANEOUS
Status: DISCONTINUED | OUTPATIENT
Start: 2020-10-24 | End: 2020-10-24

## 2020-10-24 RX ORDER — PROPOFOL 10 MG/ML
INJECTION, EMULSION INTRAVENOUS PRN
Status: DISCONTINUED | OUTPATIENT
Start: 2020-10-24 | End: 2020-10-24

## 2020-10-24 RX ORDER — SODIUM CHLORIDE, SODIUM LACTATE, POTASSIUM CHLORIDE, CALCIUM CHLORIDE 600; 310; 30; 20 MG/100ML; MG/100ML; MG/100ML; MG/100ML
INJECTION, SOLUTION INTRAVENOUS CONTINUOUS
Status: DISCONTINUED | OUTPATIENT
Start: 2020-10-24 | End: 2020-10-24 | Stop reason: HOSPADM

## 2020-10-24 RX ORDER — CARBIDOPA AND LEVODOPA 50; 200 MG/1; MG/1
1 TABLET, EXTENDED RELEASE ORAL AT BEDTIME
Status: DISCONTINUED | OUTPATIENT
Start: 2020-10-25 | End: 2020-10-28 | Stop reason: HOSPADM

## 2020-10-24 RX ORDER — FENTANYL CITRATE 50 UG/ML
INJECTION, SOLUTION INTRAMUSCULAR; INTRAVENOUS PRN
Status: DISCONTINUED | OUTPATIENT
Start: 2020-10-24 | End: 2020-10-24

## 2020-10-24 RX ORDER — ONDANSETRON 4 MG/1
4 TABLET, ORALLY DISINTEGRATING ORAL EVERY 30 MIN PRN
Status: DISCONTINUED | OUTPATIENT
Start: 2020-10-24 | End: 2020-10-24 | Stop reason: HOSPADM

## 2020-10-24 RX ORDER — ENTACAPONE 200 MG/1
200 TABLET ORAL ONCE
Status: COMPLETED | OUTPATIENT
Start: 2020-10-24 | End: 2020-10-24

## 2020-10-24 RX ORDER — HYDROMORPHONE HYDROCHLORIDE 1 MG/ML
.5-1 INJECTION, SOLUTION INTRAMUSCULAR; INTRAVENOUS; SUBCUTANEOUS
Status: DISCONTINUED | OUTPATIENT
Start: 2020-10-24 | End: 2020-10-24

## 2020-10-24 RX ORDER — CEFAZOLIN SODIUM 2 G/100ML
2 INJECTION, SOLUTION INTRAVENOUS
Status: COMPLETED | OUTPATIENT
Start: 2020-10-24 | End: 2020-10-24

## 2020-10-24 RX ORDER — FENTANYL CITRATE-0.9 % NACL/PF 10 MCG/ML
PLASTIC BAG, INJECTION (ML) INTRAVENOUS CONTINUOUS PRN
Status: DISCONTINUED | OUTPATIENT
Start: 2020-10-24 | End: 2020-10-24

## 2020-10-24 RX ORDER — MORPHINE SULFATE 4 MG/ML
4 INJECTION, SOLUTION INTRAMUSCULAR; INTRAVENOUS
Status: DISCONTINUED | OUTPATIENT
Start: 2020-10-24 | End: 2020-10-24

## 2020-10-24 RX ORDER — HYDROXYZINE HYDROCHLORIDE 10 MG/1
10 TABLET, FILM COATED ORAL EVERY 6 HOURS PRN
Status: DISCONTINUED | OUTPATIENT
Start: 2020-10-24 | End: 2020-10-28 | Stop reason: HOSPADM

## 2020-10-24 RX ORDER — ONDANSETRON 4 MG/1
4 TABLET, ORALLY DISINTEGRATING ORAL EVERY 6 HOURS PRN
Status: DISCONTINUED | OUTPATIENT
Start: 2020-10-24 | End: 2020-10-28 | Stop reason: HOSPADM

## 2020-10-24 RX ORDER — ROSUVASTATIN CALCIUM 20 MG/1
20 TABLET, COATED ORAL EVERY EVENING
Status: DISCONTINUED | OUTPATIENT
Start: 2020-10-24 | End: 2020-10-28 | Stop reason: HOSPADM

## 2020-10-24 RX ORDER — FENTANYL CITRATE 50 UG/ML
25-50 INJECTION, SOLUTION INTRAMUSCULAR; INTRAVENOUS EVERY 5 MIN PRN
Status: DISCONTINUED | OUTPATIENT
Start: 2020-10-24 | End: 2020-10-24 | Stop reason: HOSPADM

## 2020-10-24 RX ORDER — HYDROMORPHONE HYDROCHLORIDE 1 MG/ML
.3-.5 INJECTION, SOLUTION INTRAMUSCULAR; INTRAVENOUS; SUBCUTANEOUS EVERY 5 MIN PRN
Status: DISCONTINUED | OUTPATIENT
Start: 2020-10-24 | End: 2020-10-24 | Stop reason: HOSPADM

## 2020-10-24 RX ORDER — AMOXICILLIN 250 MG
1 CAPSULE ORAL 2 TIMES DAILY
Status: DISCONTINUED | OUTPATIENT
Start: 2020-10-24 | End: 2020-10-28 | Stop reason: HOSPADM

## 2020-10-24 RX ORDER — CARBIDOPA/LEVODOPA 25MG-250MG
2 TABLET ORAL ONCE
Status: COMPLETED | OUTPATIENT
Start: 2020-10-24 | End: 2020-10-24

## 2020-10-24 RX ORDER — ONDANSETRON 2 MG/ML
4 INJECTION INTRAMUSCULAR; INTRAVENOUS EVERY 30 MIN PRN
Status: DISCONTINUED | OUTPATIENT
Start: 2020-10-24 | End: 2020-10-24

## 2020-10-24 RX ORDER — MAGNESIUM HYDROXIDE 1200 MG/15ML
LIQUID ORAL PRN
Status: DISCONTINUED | OUTPATIENT
Start: 2020-10-24 | End: 2020-10-24 | Stop reason: HOSPADM

## 2020-10-24 RX ORDER — SODIUM CHLORIDE, SODIUM LACTATE, POTASSIUM CHLORIDE, CALCIUM CHLORIDE 600; 310; 30; 20 MG/100ML; MG/100ML; MG/100ML; MG/100ML
INJECTION, SOLUTION INTRAVENOUS CONTINUOUS PRN
Status: DISCONTINUED | OUTPATIENT
Start: 2020-10-24 | End: 2020-10-24

## 2020-10-24 RX ORDER — OXYCODONE HYDROCHLORIDE 5 MG/1
5-10 TABLET ORAL EVERY 4 HOURS PRN
Status: DISCONTINUED | OUTPATIENT
Start: 2020-10-24 | End: 2020-10-28 | Stop reason: HOSPADM

## 2020-10-24 RX ORDER — CARBIDOPA AND LEVODOPA 25; 100 MG/1; MG/1
2 TABLET ORAL EVERY 12 HOURS SCHEDULED
Status: DISCONTINUED | OUTPATIENT
Start: 2020-10-25 | End: 2020-10-28 | Stop reason: HOSPADM

## 2020-10-24 RX ORDER — ONDANSETRON 2 MG/ML
4 INJECTION INTRAMUSCULAR; INTRAVENOUS EVERY 6 HOURS PRN
Status: DISCONTINUED | OUTPATIENT
Start: 2020-10-24 | End: 2020-10-28 | Stop reason: HOSPADM

## 2020-10-24 RX ORDER — EPHEDRINE SULFATE 50 MG/ML
INJECTION, SOLUTION INTRAMUSCULAR; INTRAVENOUS; SUBCUTANEOUS PRN
Status: DISCONTINUED | OUTPATIENT
Start: 2020-10-24 | End: 2020-10-24

## 2020-10-24 RX ORDER — AMOXICILLIN 250 MG
2 CAPSULE ORAL 2 TIMES DAILY
Status: DISCONTINUED | OUTPATIENT
Start: 2020-10-24 | End: 2020-10-28 | Stop reason: HOSPADM

## 2020-10-24 RX ORDER — NALOXONE HYDROCHLORIDE 0.4 MG/ML
.1-.4 INJECTION, SOLUTION INTRAMUSCULAR; INTRAVENOUS; SUBCUTANEOUS
Status: DISCONTINUED | OUTPATIENT
Start: 2020-10-24 | End: 2020-10-28 | Stop reason: HOSPADM

## 2020-10-24 RX ORDER — HYDROMORPHONE HYDROCHLORIDE 1 MG/ML
0.3 INJECTION, SOLUTION INTRAMUSCULAR; INTRAVENOUS; SUBCUTANEOUS
Status: DISCONTINUED | OUTPATIENT
Start: 2020-10-24 | End: 2020-10-28 | Stop reason: HOSPADM

## 2020-10-24 RX ORDER — ONDANSETRON 2 MG/ML
INJECTION INTRAMUSCULAR; INTRAVENOUS PRN
Status: DISCONTINUED | OUTPATIENT
Start: 2020-10-24 | End: 2020-10-24

## 2020-10-24 RX ORDER — ACETAMINOPHEN 325 MG/1
975 TABLET ORAL EVERY 8 HOURS
Status: COMPLETED | OUTPATIENT
Start: 2020-10-24 | End: 2020-10-27

## 2020-10-24 RX ORDER — CEFAZOLIN SODIUM 1 G/3ML
1 INJECTION, POWDER, FOR SOLUTION INTRAMUSCULAR; INTRAVENOUS EVERY 8 HOURS
Status: COMPLETED | OUTPATIENT
Start: 2020-10-25 | End: 2020-10-25

## 2020-10-24 RX ORDER — CEFAZOLIN SODIUM 1 G/3ML
1 INJECTION, POWDER, FOR SOLUTION INTRAMUSCULAR; INTRAVENOUS SEE ADMIN INSTRUCTIONS
Status: DISCONTINUED | OUTPATIENT
Start: 2020-10-24 | End: 2020-10-24 | Stop reason: HOSPADM

## 2020-10-24 RX ORDER — ENTACAPONE 200 MG/1
200 TABLET ORAL 4 TIMES DAILY
Status: DISCONTINUED | OUTPATIENT
Start: 2020-10-24 | End: 2020-10-28 | Stop reason: HOSPADM

## 2020-10-24 RX ORDER — GABAPENTIN 300 MG/1
300 CAPSULE ORAL 3 TIMES DAILY
Status: DISCONTINUED | OUTPATIENT
Start: 2020-10-25 | End: 2020-10-28 | Stop reason: HOSPADM

## 2020-10-24 RX ORDER — LABETALOL HYDROCHLORIDE 5 MG/ML
10 INJECTION, SOLUTION INTRAVENOUS
Status: DISCONTINUED | OUTPATIENT
Start: 2020-10-24 | End: 2020-10-24 | Stop reason: HOSPADM

## 2020-10-24 RX ORDER — ONDANSETRON 2 MG/ML
4 INJECTION INTRAMUSCULAR; INTRAVENOUS EVERY 30 MIN PRN
Status: DISCONTINUED | OUTPATIENT
Start: 2020-10-24 | End: 2020-10-24 | Stop reason: HOSPADM

## 2020-10-24 RX ORDER — HYDROCHLOROTHIAZIDE 12.5 MG/1
12.5 CAPSULE ORAL EVERY MORNING
Status: DISCONTINUED | OUTPATIENT
Start: 2020-10-25 | End: 2020-10-28 | Stop reason: HOSPADM

## 2020-10-24 RX ORDER — CARBIDOPA AND LEVODOPA 25; 100 MG/1; MG/1
2 TABLET ORAL ONCE
Status: COMPLETED | OUTPATIENT
Start: 2020-10-24 | End: 2020-10-24

## 2020-10-24 RX ORDER — LIDOCAINE 40 MG/G
CREAM TOPICAL
Status: DISCONTINUED | OUTPATIENT
Start: 2020-10-24 | End: 2020-10-28 | Stop reason: HOSPADM

## 2020-10-24 RX ADMIN — PHENYLEPHRINE HYDROCHLORIDE 100 MCG: 10 INJECTION INTRAVENOUS at 15:15

## 2020-10-24 RX ADMIN — CEFAZOLIN SODIUM 1 G: 2 INJECTION, SOLUTION INTRAVENOUS at 18:25

## 2020-10-24 RX ADMIN — PHENYLEPHRINE HYDROCHLORIDE 100 MCG: 10 INJECTION INTRAVENOUS at 16:13

## 2020-10-24 RX ADMIN — Medication 5 MG: at 15:30

## 2020-10-24 RX ADMIN — ROCURONIUM BROMIDE 10 MG: 10 INJECTION INTRAVENOUS at 17:02

## 2020-10-24 RX ADMIN — HYDROMORPHONE HYDROCHLORIDE 0.3 MG: 1 INJECTION, SOLUTION INTRAMUSCULAR; INTRAVENOUS; SUBCUTANEOUS at 22:10

## 2020-10-24 RX ADMIN — Medication 5 MG: at 15:36

## 2020-10-24 RX ADMIN — MIDAZOLAM 1 MG: 1 INJECTION INTRAMUSCULAR; INTRAVENOUS at 14:08

## 2020-10-24 RX ADMIN — HYDROMORPHONE HYDROCHLORIDE 1 MG: 1 INJECTION, SOLUTION INTRAMUSCULAR; INTRAVENOUS; SUBCUTANEOUS at 10:40

## 2020-10-24 RX ADMIN — PHENYLEPHRINE HYDROCHLORIDE 100 MCG: 10 INJECTION INTRAVENOUS at 15:36

## 2020-10-24 RX ADMIN — CARBIDOPA AND LEVODOPA 2 TABLET: 25; 250 TABLET ORAL at 20:46

## 2020-10-24 RX ADMIN — HYDROMORPHONE HYDROCHLORIDE 0.5 MG: 1 INJECTION, SOLUTION INTRAMUSCULAR; INTRAVENOUS; SUBCUTANEOUS at 20:04

## 2020-10-24 RX ADMIN — HYDROMORPHONE HYDROCHLORIDE 0.5 MG: 1 INJECTION, SOLUTION INTRAMUSCULAR; INTRAVENOUS; SUBCUTANEOUS at 19:00

## 2020-10-24 RX ADMIN — CEFAZOLIN SODIUM 1 G: 2 INJECTION, SOLUTION INTRAVENOUS at 16:25

## 2020-10-24 RX ADMIN — SODIUM CHLORIDE, POTASSIUM CHLORIDE, SODIUM LACTATE AND CALCIUM CHLORIDE: 600; 310; 30; 20 INJECTION, SOLUTION INTRAVENOUS at 14:01

## 2020-10-24 RX ADMIN — Medication 5 MG: at 14:48

## 2020-10-24 RX ADMIN — FENTANYL CITRATE 50 MCG: 50 INJECTION, SOLUTION INTRAMUSCULAR; INTRAVENOUS at 14:13

## 2020-10-24 RX ADMIN — ROSUVASTATIN CALCIUM 20 MG: 20 TABLET, FILM COATED ORAL at 22:48

## 2020-10-24 RX ADMIN — CARBIDOPA AND LEVODOPA 2 TABLET: 25; 100 TABLET ORAL at 13:52

## 2020-10-24 RX ADMIN — SODIUM CHLORIDE, POTASSIUM CHLORIDE, SODIUM LACTATE AND CALCIUM CHLORIDE: 600; 310; 30; 20 INJECTION, SOLUTION INTRAVENOUS at 18:53

## 2020-10-24 RX ADMIN — ENTACAPONE 200 MG: 200 TABLET, FILM COATED ORAL at 20:46

## 2020-10-24 RX ADMIN — SODIUM CHLORIDE, POTASSIUM CHLORIDE, SODIUM LACTATE AND CALCIUM CHLORIDE: 600; 310; 30; 20 INJECTION, SOLUTION INTRAVENOUS at 14:07

## 2020-10-24 RX ADMIN — PHENYLEPHRINE HYDROCHLORIDE 100 MCG: 10 INJECTION INTRAVENOUS at 15:19

## 2020-10-24 RX ADMIN — HYDROMORPHONE HYDROCHLORIDE 0.5 MG: 1 INJECTION, SOLUTION INTRAMUSCULAR; INTRAVENOUS; SUBCUTANEOUS at 11:45

## 2020-10-24 RX ADMIN — SUGAMMADEX 200 MG: 100 INJECTION, SOLUTION INTRAVENOUS at 19:32

## 2020-10-24 RX ADMIN — FENTANYL CITRATE 50 MCG: 0.05 INJECTION, SOLUTION INTRAMUSCULAR; INTRAVENOUS at 20:44

## 2020-10-24 RX ADMIN — FENTANYL CITRATE 50 MCG: 50 INJECTION, SOLUTION INTRAMUSCULAR; INTRAVENOUS at 14:31

## 2020-10-24 RX ADMIN — DOCUSATE SODIUM 50 MG AND SENNOSIDES 8.6 MG 1 TABLET: 8.6; 5 TABLET, FILM COATED ORAL at 22:47

## 2020-10-24 RX ADMIN — PHENYLEPHRINE HYDROCHLORIDE 100 MCG: 10 INJECTION INTRAVENOUS at 14:36

## 2020-10-24 RX ADMIN — Medication 5 MG: at 15:49

## 2020-10-24 RX ADMIN — TRANEXAMIC ACID 1070 MG: 100 INJECTION, SOLUTION INTRAVENOUS at 15:44

## 2020-10-24 RX ADMIN — PHENYLEPHRINE HYDROCHLORIDE 100 MCG: 10 INJECTION INTRAVENOUS at 14:48

## 2020-10-24 RX ADMIN — CARBIDOPA AND LEVODOPA 1 HALF-TAB: 25; 100 TABLET ORAL at 13:53

## 2020-10-24 RX ADMIN — LIDOCAINE HYDROCHLORIDE 100 MG: 20 INJECTION, SOLUTION INFILTRATION; PERINEURAL at 14:13

## 2020-10-24 RX ADMIN — TRANEXAMIC ACID 1070 MG: 100 INJECTION, SOLUTION INTRAVENOUS at 18:55

## 2020-10-24 RX ADMIN — Medication 5 MG: at 14:36

## 2020-10-24 RX ADMIN — ENTACAPONE 200 MG: 200 TABLET, FILM COATED ORAL at 13:54

## 2020-10-24 RX ADMIN — PHENYLEPHRINE HYDROCHLORIDE 100 MCG: 10 INJECTION INTRAVENOUS at 15:30

## 2020-10-24 RX ADMIN — Medication 0.25 MCG/MIN: at 16:13

## 2020-10-24 RX ADMIN — PHENYLEPHRINE HYDROCHLORIDE 100 MCG: 10 INJECTION INTRAVENOUS at 15:03

## 2020-10-24 RX ADMIN — PROPOFOL 180 MG: 10 INJECTION, EMULSION INTRAVENOUS at 14:13

## 2020-10-24 RX ADMIN — PHENYLEPHRINE HYDROCHLORIDE 100 MCG: 10 INJECTION INTRAVENOUS at 15:49

## 2020-10-24 RX ADMIN — HYDROMORPHONE HYDROCHLORIDE 0.5 MG: 1 INJECTION, SOLUTION INTRAMUSCULAR; INTRAVENOUS; SUBCUTANEOUS at 20:54

## 2020-10-24 RX ADMIN — ONDANSETRON 4 MG: 2 INJECTION INTRAMUSCULAR; INTRAVENOUS at 10:40

## 2020-10-24 RX ADMIN — ROCURONIUM BROMIDE 50 MG: 10 INJECTION INTRAVENOUS at 14:13

## 2020-10-24 RX ADMIN — Medication 5 MG: at 15:43

## 2020-10-24 RX ADMIN — PHENYLEPHRINE HYDROCHLORIDE 50 MCG: 10 INJECTION INTRAVENOUS at 16:07

## 2020-10-24 RX ADMIN — CEFAZOLIN SODIUM 2 G: 2 INJECTION, SOLUTION INTRAVENOUS at 14:25

## 2020-10-24 RX ADMIN — ROCURONIUM BROMIDE 30 MG: 10 INJECTION INTRAVENOUS at 15:30

## 2020-10-24 RX ADMIN — OXYCODONE HYDROCHLORIDE 5 MG: 5 TABLET ORAL at 22:48

## 2020-10-24 RX ADMIN — Medication 5 MG: at 15:03

## 2020-10-24 RX ADMIN — PHENYLEPHRINE HYDROCHLORIDE 100 MCG: 10 INJECTION INTRAVENOUS at 17:13

## 2020-10-24 RX ADMIN — Medication 5 MG: at 15:15

## 2020-10-24 RX ADMIN — ROCURONIUM BROMIDE 10 MG: 10 INJECTION INTRAVENOUS at 18:21

## 2020-10-24 RX ADMIN — SODIUM CHLORIDE, POTASSIUM CHLORIDE, SODIUM LACTATE AND CALCIUM CHLORIDE: 600; 310; 30; 20 INJECTION, SOLUTION INTRAVENOUS at 15:17

## 2020-10-24 RX ADMIN — PHENYLEPHRINE HYDROCHLORIDE 100 MCG: 10 INJECTION INTRAVENOUS at 15:43

## 2020-10-24 RX ADMIN — ACETAMINOPHEN 975 MG: 325 TABLET, FILM COATED ORAL at 22:47

## 2020-10-24 RX ADMIN — ONDANSETRON 4 MG: 2 INJECTION INTRAMUSCULAR; INTRAVENOUS at 18:57

## 2020-10-24 RX ADMIN — PHENYLEPHRINE HYDROCHLORIDE 100 MCG: 10 INJECTION INTRAVENOUS at 16:01

## 2020-10-24 ASSESSMENT — ENCOUNTER SYMPTOMS
ROS GI COMMENTS: NO LOSS OF BOWEL CONTROL
NAUSEA: 0
ABDOMINAL PAIN: 0
FEVER: 0
COUGH: 0
VOMITING: 0
BACK PAIN: 1

## 2020-10-24 ASSESSMENT — MIFFLIN-ST. JEOR
SCORE: 1676.2
SCORE: 1676.2

## 2020-10-24 NOTE — PROGRESS NOTES
I was notified this morning by the [facility] urgent care line that the patient in the early hours this morning had developed severe bilateral leg pain, after having some hamstring cramping yesterday during the day.  Prior to this she had been recovering well from her laminotomy on 10/21, with resolution of her preoperative radiculopathy and minimal leg pain.    I called and talked with the patient at that time noted significant pain in the bilateral lower extremities radiating down from the back, and also subjective weakness in the legs.  Pain did not follow any specific dermatomal distribution of the legs.  Denied any saddle anesthesia.  Did note what sounds like some overflow incontinence.    I advised the patient to proceed to the [facility] emergency department emergently.  She called an ambulance and was transported here.        On my examination, the patient reports that she has mildly decreased sensation across the right anterolateral shin and anterior knee, and dorsal foot.  Sensation intact fully the left lower extremity.  Reports intact sensation to the genitals and anal region.    Exam:      Spine:   Skin: intact over lumbar spine without evidence of infection    Sensation:      R       L    L3:   Intact   Intact    L4:   Decreased Intact    L5:   Decreased Intact    S1:   Intact   Intact     Motor:     R L    L3: Psoas    4  4    L4:   Quad    4  4    L4/5: TA   5 5    L5: EHL    5  5    S1: Eversion/PF  5  5         Bladder scan: 600cc        Assessment: 3 days status post laminotomy right L4-5 now with symptoms and signs concerning for acute epidural hematoma leading to cauda equina syndrome.      Plan:  - STAT MRI L spine T2-sequence only to determine the extent of any hematoma to evaluate if any additional levels need to be decompressed    -Anticipate proceeding to the operating room for hematoma evacuation as soon as the MRI is completed    -N.p.o. for OR    -Place Wolf Lr,  MD  Orthopaedic Spine Surgery

## 2020-10-24 NOTE — ED NOTES
Bed: ED19  Expected date: 10/24/20  Expected time: 10:06 AM  Means of arrival: Ambulance  Comments:  [facility] 70f post op back pain ETA 1017

## 2020-10-24 NOTE — PROGRESS NOTES
MRI complete, demonstrates epidural hematoma L2-5.    Will proceed to OR for laminotomies L2-4, evacuation of epidural hematoma L2-5.      Jose Lr MD  Orthopaedic Spine Surgery

## 2020-10-24 NOTE — ED PROVIDER NOTES
History     Chief Complaint:  Back Pain    The history is provided by the patient.      Teagan Rand is a 70 year old female diagnosed with Parkinson's disease, anxiety, osteoporosis amongst others as noted below, not currently anticoagulated, who presents alone via EMS for evaluation of bilateral low back pain with radiation down both legs in the setting of undergoing a right L4-5 laminotomy two days ago by Dr. Lr. Patient reports the following day after surgery, she was doing well, but yesterday began to have some difficulty ambulating secondary to pain. She states this morning, she was unable to walk due to severe pain, despite taking two doses of Oxycodone at 0800, and was incontinent of urine, thus called her surgeon, who recommended patient present to the ED for an MRI. EMS was called.     En route, EMS provided patient 100 mcg of Fentanyl.     Here, patient reports no loss of bowel control and states her spouse and her sister-in-law, who is a retired nurse, has been monitoring her incisions and state they are healing well. She denies any abdominal pain, fever, cough, nausea or vomiting.     Allergies:  No Known Drug Allergies     Medications:    Gocovri  Sinemet  Comtan  Neurontin  Hydrochlorothiazide  Melatonin  Oxycodone  Crestor  Senna-docuate    Past Medical History:    Anxiety  Benign paroxysmal positional vertigo  Dyskinesia  Hyperlipidemia  Hypertension  Livedo reticularis  Muscle cramps  Obese  Osteoporosis  Parkinson's disease  Polyclonal gammopathy  Sacroiliitis  Sensorineural hearing loss  Sinusitis  Sleep apnea   Thyroid disorder  Tongue symptom  Vitamin B6 deficiency   Vitamin D deficiency     Past Surgical History:    Appendectomy  Cholecystectomy  Colonoscopy  Hysterectomy  Laminectomy lumbar minimally invasive one level - right  Right Lumbar L4-5 laminotomy   Orthopedic surgery - bilateral knee arthroscopy meniscus repair     Family History:    History reviewed. No pertinent family  "history.       Social History:  The patient was accompanied to the ED by EMS.  Smoking Status: Never  Smokeless Tobacco: Never  Alcohol Use: Yes  Drug Use: Never   Marital Status:   [2]     Review of Systems   Constitutional: Negative for fever.   Respiratory: Negative for cough.    Gastrointestinal: Negative for abdominal pain, nausea and vomiting.        No loss of bowel control   Genitourinary:        Urinary incontinence   Musculoskeletal: Positive for back pain.   All other systems reviewed and are negative.    Physical Exam     Patient Vitals for the past 24 hrs:   BP Temp Temp src Pulse Resp SpO2 Height Weight   10/24/20 1411 133/66 100  F (37.8  C) Temporal 87 12 (!) 89 % -- --   10/24/20 1317 -- -- -- 80 -- 99 % -- --   10/24/20 1200 (!) 147/71 -- -- 82 20 99 % -- --   10/24/20 1130 (!) 150/70 -- -- 88 -- 97 % -- --   10/24/20 1100 (!) 163/72 -- -- 86 20 98 % -- --   10/24/20 1051 (!) 165/84 -- -- 89 20 94 % -- --   10/24/20 1040 -- -- -- -- -- -- 1.778 m (5' 10\") 106.6 kg (235 lb)   10/24/20 1035 (!) 177/91 98  F (36.7  C) Oral 95 20 98 % -- --       Physical Exam  Constitutional: Well developed, uncomfortable, nontox appearance  Head: Atraumatic.    Neck:  no stridor  Eyes: no scleral icterus  Cardiovascular: RRR, 2+ bilat DP pulses  Pulmonary/Chest: nml resp effort  Abdominal: ND, soft, NT, no rebound or guarding   Ext: Warm, well perfused, no edema  Neurological: A&O, symmetric facies, sensation intact, 5/5 dorsi and plantarflexion bilat  Skin: Skin is warm and dry.   Psychiatric: Behavior is normal. Thought content normal.   Nursing note and vitals reviewed.    Emergency Department Course     Imaging:  Radiology findings were communicated with the patient who voiced understanding of the findings.    Lumbar Spine MRI w & w/o Contrast:  IMPRESSION:   1. Diffuse degenerative changes of the lumbar spine as detailed above   with areas of spinal canal and neural foraminal narrowing as detailed "   above.   2. Posterior midline epidural fluid collection extending from the   upper L2 level to the mid L5 level likely representing an epidural   hematoma that, combined with degenerative changes, results in   moderate-severe narrowing of the thecal sac at the L2-L3, L3-L4 and   L4-L5 levels.   3. Presumed right L4 laminotomy for discectomy.   Reading per radiology.     Laboratory:  Laboratory findings were communicated with the patient who voiced understanding of the findings.    CBC: AWNL (WBC 9.3, HGB 13.7, )  BMP: Glucose 111 (H) o/w WNL (Creatinine 0.79)  INR: 0.98      Asymptomatic COVID-19 (Coronavirus) PCR by Nasopharyngeal Swab: Pending     Interventions:  1040 Zofran 4 mg IV  1040 Dilaudid 1 mg IV  1145 Dilaudid 0.5 mg IV    Emergency Department Course:  Past medical records, nursing notes, and vitals reviewed.    (1031)   I performed an exam of the patient as documented above. History obtained from patient.    (1031)   IV was inserted and blood was drawn for laboratory testing, results above.    (1257)   The patient was sent for a Lumbar Spine MRI w & w/o Contrast while in the emergency department, results above.      (1127)   I spoke with Dr. Lr of the Orthopedic service regarding patient's presentation, findings, and plan of care after he evaluated the patient.     (1159)   A nasal swab was obtained for laboratory testing, findings above.      (1321)   I rechecked the patient and discussed the results of her workup thus far. Updated that patient will be going to the OR with Dr. Lr.     Findings and plan explained to the Patient. Patient will be transferred to OR. Discussed the case with Dr. Lr, who will admit the patient to a monitored bed for further monitoring, evaluation, and treatment following surgery.    I personally reviewed the laboratory and imaging results with the Patient and answered all related questions prior to transfer to OR.    Impression & Plan     Medical Decision  Makin-year-old female presenting with worsening back pain status post right L4-5 hemilaminotomy     Differential diagnosis includes poorly controlled postop pain, epidural hematoma, cauda equina syndrome, hardware failure although unlikely.  MRI ordered and significant for likely epidural hematoma w/ compression of thecal sac.  Labs unremarkable.  Interventions ordered as noted above with slight in symptoms.  I discussed the patient with spinal surgery who evaluated the pt in the ED and recommended hematoma evacuation in the OR.  Pt transferred to OR in stable condition.  She was counseled on results, diagnosis and disposition.  She is understanding and agreeable to plan.    Covid-19  Teagan Rand was evaluated during a global COVID-19 pandemic, which necessitated consideration that the patient might be at risk for infection with the SARS-CoV-2 virus that causes COVID-19.   Applicable protocols for evaluation were followed during the patient's care.   COVID-19 was considered as part of the patient's evaluation. The plan for testing is:  a test was obtained during this visit.    Diagnosis:    ICD-10-CM    1. Epidural hematoma (H)  S06.4X9A        Disposition:  Transferred to OR.    Scribe Disclosure:  Angela GORDON, am serving as a scribe at 10:31 AM on 10/24/2020 to document services personally performed by Admaa Ruffin MD based on my observations and the provider's statements to me.   10/24/2020   [facility] EMERGENCY DEPT       Adama Ruffin MD  10/24/20 5120

## 2020-10-24 NOTE — ED TRIAGE NOTES
Patient had BACK SYRGERY 2 DAYs ago r/t arthritis. Incontinent of bowel and bladder, surgeon called to head to ED for MRI.  Oxycodone x2 0800  10/10 pain  Fentanyl 100mcg given by EMS

## 2020-10-25 LAB
ANION GAP SERPL CALCULATED.3IONS-SCNC: 3 MMOL/L (ref 3–14)
BUN SERPL-MCNC: 11 MG/DL (ref 7–30)
CALCIUM SERPL-MCNC: 8.7 MG/DL (ref 8.5–10.1)
CHLORIDE SERPL-SCNC: 108 MMOL/L (ref 94–109)
CO2 SERPL-SCNC: 28 MMOL/L (ref 20–32)
CREAT SERPL-MCNC: 0.78 MG/DL (ref 0.52–1.04)
ERYTHROCYTE [DISTWIDTH] IN BLOOD BY AUTOMATED COUNT: 12.9 % (ref 10–15)
GFR SERPL CREATININE-BSD FRML MDRD: 78 ML/MIN/{1.73_M2}
GLUCOSE BLDC GLUCOMTR-MCNC: 106 MG/DL (ref 70–99)
GLUCOSE SERPL-MCNC: 106 MG/DL (ref 70–99)
HCT VFR BLD AUTO: 35.5 % (ref 35–47)
HGB BLD-MCNC: 11.5 G/DL (ref 11.7–15.7)
MCH RBC QN AUTO: 31.4 PG (ref 26.5–33)
MCHC RBC AUTO-ENTMCNC: 32.4 G/DL (ref 31.5–36.5)
MCV RBC AUTO: 97 FL (ref 78–100)
PLATELET # BLD AUTO: 189 10E9/L (ref 150–450)
POTASSIUM SERPL-SCNC: 4.1 MMOL/L (ref 3.4–5.3)
RBC # BLD AUTO: 3.66 10E12/L (ref 3.8–5.2)
SALICYLATES SERPL-MCNC: 2 MG/DL
SODIUM SERPL-SCNC: 139 MMOL/L (ref 133–144)
WBC # BLD AUTO: 8.5 10E9/L (ref 4–11)

## 2020-10-25 PROCEDURE — 120N000001 HC R&B MED SURG/OB

## 2020-10-25 PROCEDURE — 80048 BASIC METABOLIC PNL TOTAL CA: CPT | Performed by: ORTHOPAEDIC SURGERY

## 2020-10-25 PROCEDURE — 85027 COMPLETE CBC AUTOMATED: CPT | Performed by: ORTHOPAEDIC SURGERY

## 2020-10-25 PROCEDURE — 999N001017 HC STATISTIC GLUCOSE BY METER IP

## 2020-10-25 PROCEDURE — 250N000013 HC RX MED GY IP 250 OP 250 PS 637: Performed by: ORTHOPAEDIC SURGERY

## 2020-10-25 PROCEDURE — 250N000013 HC RX MED GY IP 250 OP 250 PS 637: Performed by: INTERNAL MEDICINE

## 2020-10-25 PROCEDURE — 99232 SBSQ HOSP IP/OBS MODERATE 35: CPT | Performed by: INTERNAL MEDICINE

## 2020-10-25 PROCEDURE — 36415 COLL VENOUS BLD VENIPUNCTURE: CPT | Performed by: ORTHOPAEDIC SURGERY

## 2020-10-25 PROCEDURE — 250N000011 HC RX IP 250 OP 636: Performed by: ORTHOPAEDIC SURGERY

## 2020-10-25 PROCEDURE — 250N000013 HC RX MED GY IP 250 OP 250 PS 637: Performed by: PHYSICIAN ASSISTANT

## 2020-10-25 RX ORDER — MAGNESIUM CARB/ALUMINUM HYDROX 105-160MG
296 TABLET,CHEWABLE ORAL DAILY PRN
Status: DISCONTINUED | OUTPATIENT
Start: 2020-10-25 | End: 2020-10-28 | Stop reason: HOSPADM

## 2020-10-25 RX ORDER — POLYETHYLENE GLYCOL 3350 17 G/17G
17 POWDER, FOR SOLUTION ORAL 2 TIMES DAILY
Status: DISCONTINUED | OUTPATIENT
Start: 2020-10-25 | End: 2020-10-28 | Stop reason: HOSPADM

## 2020-10-25 RX ADMIN — ACETAMINOPHEN 975 MG: 325 TABLET, FILM COATED ORAL at 05:33

## 2020-10-25 RX ADMIN — ACETAMINOPHEN 975 MG: 325 TABLET, FILM COATED ORAL at 21:47

## 2020-10-25 RX ADMIN — POLYETHYLENE GLYCOL 3350 17 G: 17 POWDER, FOR SOLUTION ORAL at 12:15

## 2020-10-25 RX ADMIN — Medication 25 MG: at 08:57

## 2020-10-25 RX ADMIN — GABAPENTIN 300 MG: 300 CAPSULE ORAL at 08:56

## 2020-10-25 RX ADMIN — ENTACAPONE 200 MG: 200 TABLET, FILM COATED ORAL at 20:01

## 2020-10-25 RX ADMIN — ACETAMINOPHEN 975 MG: 325 TABLET, FILM COATED ORAL at 13:04

## 2020-10-25 RX ADMIN — GABAPENTIN 300 MG: 300 CAPSULE ORAL at 16:02

## 2020-10-25 RX ADMIN — CARBIDOPA AND LEVODOPA 1 TABLET: 50; 200 TABLET, EXTENDED RELEASE ORAL at 01:03

## 2020-10-25 RX ADMIN — CEFAZOLIN 1 G: 1 INJECTION, POWDER, FOR SOLUTION INTRAMUSCULAR; INTRAVENOUS at 01:53

## 2020-10-25 RX ADMIN — CEFAZOLIN 1 G: 1 INJECTION, POWDER, FOR SOLUTION INTRAMUSCULAR; INTRAVENOUS at 10:09

## 2020-10-25 RX ADMIN — OXYCODONE HYDROCHLORIDE 5 MG: 5 TABLET ORAL at 17:41

## 2020-10-25 RX ADMIN — CARBIDOPA AND LEVODOPA 2 TABLET: 25; 100 TABLET ORAL at 20:01

## 2020-10-25 RX ADMIN — CARBIDOPA AND LEVODOPA 2 TABLET: 25; 100 TABLET ORAL at 08:57

## 2020-10-25 RX ADMIN — HYDROMORPHONE HYDROCHLORIDE 0.3 MG: 1 INJECTION, SOLUTION INTRAMUSCULAR; INTRAVENOUS; SUBCUTANEOUS at 00:16

## 2020-10-25 RX ADMIN — DOCUSATE SODIUM 50 MG AND SENNOSIDES 8.6 MG 2 TABLET: 8.6; 5 TABLET, FILM COATED ORAL at 08:56

## 2020-10-25 RX ADMIN — ENTACAPONE 200 MG: 200 TABLET, FILM COATED ORAL at 08:56

## 2020-10-25 RX ADMIN — OXYCODONE HYDROCHLORIDE 10 MG: 5 TABLET ORAL at 03:10

## 2020-10-25 RX ADMIN — OXYCODONE HYDROCHLORIDE 10 MG: 5 TABLET ORAL at 08:56

## 2020-10-25 RX ADMIN — HYDROCHLOROTHIAZIDE 12.5 MG: 12.5 CAPSULE ORAL at 08:57

## 2020-10-25 RX ADMIN — ENTACAPONE 200 MG: 200 TABLET, FILM COATED ORAL at 12:15

## 2020-10-25 RX ADMIN — ROSUVASTATIN CALCIUM 20 MG: 20 TABLET, FILM COATED ORAL at 20:01

## 2020-10-25 RX ADMIN — CARBIDOPA AND LEVODOPA 1 TABLET: 50; 200 TABLET, EXTENDED RELEASE ORAL at 23:17

## 2020-10-25 RX ADMIN — SODIUM PHOSPHATE, DIBASIC AND SODIUM PHOSPHATE, MONOBASIC 1 ENEMA: 7; 19 ENEMA RECTAL at 10:09

## 2020-10-25 RX ADMIN — GABAPENTIN 300 MG: 300 CAPSULE ORAL at 00:19

## 2020-10-25 RX ADMIN — ENTACAPONE 200 MG: 200 TABLET, FILM COATED ORAL at 16:01

## 2020-10-25 RX ADMIN — Medication: at 12:14

## 2020-10-25 RX ADMIN — GABAPENTIN 300 MG: 300 CAPSULE ORAL at 23:18

## 2020-10-25 RX ADMIN — Medication: at 16:01

## 2020-10-25 RX ADMIN — OXYCODONE HYDROCHLORIDE 5 MG: 5 TABLET ORAL at 21:47

## 2020-10-25 ASSESSMENT — ACTIVITIES OF DAILY LIVING (ADL)
ADLS_ACUITY_SCORE: 16
ADLS_ACUITY_SCORE: 17
WHICH_OF_THE_ABOVE_FUNCTIONAL_RISKS_HAD_A_RECENT_ONSET_OR_CHANGE?: AMBULATION;TRANSFERRING;TOILETING;BATHING;DRESSING
DOING_ERRANDS_INDEPENDENTLY_DIFFICULTY: NO
ADLS_ACUITY_SCORE: 20
FALL_HISTORY_WITHIN_LAST_SIX_MONTHS: YES
ADLS_ACUITY_SCORE: 18
ADLS_ACUITY_SCORE: 16
NUMBER_OF_TIMES_PATIENT_HAS_FALLEN_WITHIN_LAST_SIX_MONTHS: 1
TOILETING_ISSUES: NO
ADLS_ACUITY_SCORE: 18

## 2020-10-25 NOTE — PLAN OF CARE
PT:Pt has been on flat bedrest and has not had HOB gradually elevated due to needing enema. PT eval will be rescheduled for PM    PM: Pt has not reached 90 degrees upright so eval deferred.

## 2020-10-25 NOTE — OP NOTE
Orthopedic Surgery Operative Report    Patient:   Teagan Rand  MRN:   [id number]   :  1950  Facility: [facility]   Date:  10/24/2020        PREOPERATIVE DIAGNOSIS:    Lumbar epidural hematoma    POSTOPERATIVE DIAGNOSIS:    Lumbar epidural hematoma    PROCEDURE PERFORMED:    1. Evacuation lumbar epidural hematoma L2-L5  2. Revision jazz-laminotomy right L4-5  3. Laminectomy L2-L4  4. Primary watertight repair of incidental durotomy    SURGEON:    Jose Lr MD    SURGICAL ASSISTANT:    None    ANESTHESIA:  General    FINDINGS:    Large epidural hematoma extending from L2-L5.      The patient's tissues had atypical bleeding present throughout the case, which made progress very slow as hemostatic agents were needed frequently to be able to stop the bleeding.      COMPLICATIONS:     Incidental durotomy at the L3 level which was recognized and primarily repaired in watertight fashion with DuraGen and DuraSeal    SPECIMENS:    None    ESTIMATED BLOOD LOSS:  600 cc    IMPLANTS:    DuraGen patch 2 cm x 2 cm, DuraSeal    INDICATION FOR OPERATION:  The patient is a 70-year-old female who 3 days ago underwent an L4-5 right hemilaminotomy to decompress her L5 nerve root which was causing severe radiculopathy.  This was done through tubular retractor.  At the conclusion of the case, final hemostasis was achieved no active bleeding was demonstrated.    The patient did well following that operation discharged home on the same day of surgery.  She reported that she was doing exceptionally well for the first day and a half after surgery, noting that her preop radiculopathy had resolved, and the surgical site pain was relatively minimal.    Yesterday she started noticing some tightness in the bilateral hamstrings which she thought were muscle cramps.  She called my office and said is much, and I therefore prescribed medication to help with muscle relaxation.    This morning the patient awoke  with severe pain and weakness in the bilateral lower extremities, as well as urinary incontinence.  She called [facility] orthopedics urgent care line, and the physician on-call contacted me to let me know.  I contacted the patient and in discussion with her I was concerned that she may have an epidural hematoma causing an evolving cauda equina syndrome.  I urged her to proceed to the emergency department as quickly as possible via ambulance for evaluation.    I met the patient in the emergency department where my examination and her post void residual were consistent with an evolving cauda equina syndrome.  We obtained a stat MRI of the lumbar spine with limited sequences which did in fact demonstrate a lumbar epidural hematoma compressive of the thecal sac.    I discussed with the patient and her  the risks benefits and alternatives to a decompression and evacuation of hematoma as a means to decompress her nerve roots with the hope of preserving neurologic function.  I did notice that in the setting of a cauda equina syndrome, bladder function specifically is not a guarantee to return afterwards, but by moving quickly I hopes that we would minimize the risk of permanent dysfunction.    I discussed the risks benefits and alternatives of the operation with the patient and her .  They wished to proceed.        DESCRIPTION OF PROCEDURE:    The patient was met in the preoperative holding area and the operative site was confirmed and marked.  We once again reviewed the risks, benefits, and alternatives of the operation and the patient wished to proceed with the surgery.    The patient was taken back to the operating room and induced under general anesthesia.  The patient was positioned prone with all bony prominences well padded.  The surgical site was prepped and draped in the usual standard fashion.    The patient was met in the preoperative holding area and the operative site was confirmed and marked.   We once again reviewed the risks, benefits, and alternatives of the operation and the patient wished to proceed with the surgery.     The patient was transported to the operating room and induced under general anesthesia.  The patient was positioned prone with all bony prominences well padded.  The surgical site was prepped and draped in the usual standard fashion.     A midline incision was made over the lumbar spine getting at the patient's prior incision site and carried superior, and dissection was carried down to the spinous processes.  The midline structures including the interspinous ligaments were left intact, and the muscle was elevated off of the posterior vertebral elements on the right side.    I confirmed level by the presence of the right L4-5 hemilaminotomy.  As this was already exposed, I passed a Petra into the hemilaminotomy site to remove some of the hematoma at the L4-5 level as this was easy to do at this point.    No explicit site of active hemorrhage was noted in the prior operative site.  However the patient's tissues were atypically prone to bleeding throughout the case, with very challenging hemostasis present throughout.  I frequently had to use hemostatic agents to be able to stem the bleeding, as even Bovie and bipolar often were ineffective at ceasing the bleeding even in easy-to-access areas.    I extended my right-sided dissection up to the L2 vertebra.  Given the MRI demonstrated that the neurologic stenosis was most severe due to the compressive hematoma at the L3-4 level, I started with this level.  I performed a hemilaminotomy on the right side at L3-4, reaching the ligamentum flavum.  I detached ligamentum flavum from its superior and inferior attachments, but as I worked in the lateral recess unfortunately while taking down the ligamentum flavum an incidental durotomy occurred at the L3 level.  I placed a barbara over the top of the durotomy to keep the arachnoid protected and  minimize ongoing spinal fluid leakage, and then continued to decompress the neurologic elements of the L2-3 and the L3-4 level.    As I continued decompressed from the right side, I recognized that the incidental durotomy would be very challenging repair given its position along the lateral aspect of the dura from unilateral approach only.  I therefore decided to do a bilateral approach to the spine and a laminectomy of L3.    I dissected the paraspinal muscles off the left side of the spine down onto the lamina of L2-L3-L4 and leading edge of L5.  I resected the spinous process of L3, thereby exposing the dura bilaterally and give me access to the incidental durotomy.    I performed a primary repair of the incidental durotomy using 6-0 Prolene, ensuring that no arachnoid was captured in the passes of my needle, and that none herniated out through the dura.    I continued my decompression.  As the L3 laminectomy had already been performed, I performed a laminectomy of the caudal aspect of the L2 spinous process and lamina.  With this I was able to extend my decompression to the superior border of the hematoma and fully decompress the neurologic elements.    Along the caudal aspect of the operative site, I resected the leading edge of the L4 vertebrae as it was easily accessible with the L3 laminectomy, and then carried my prior L4-5 hemilaminotomy across midline performing a bilateral decompression through the unilateral laminotomy and resecting the leading edge of L5.  At conclusion of these steps, I was able to freely pass a Emanuel probe underneath the remaining leading edge of the L4 vertebrae to demonstrate that there was no ongoing hematoma present at that site, as well as passing the probe caudally along the bitten-back L5 leading edge to remove all hematoma there.    I achieved hemostasis which to reiterate was challenging as it had been throughout the case.  I then placed a DuraGen patch and covered it with  DuraSeal over the site of the L3 incidental durotomy.  After this I asked the anesthesiologist to perform a Valsalva which he did and demonstrated no ongoing CSF leakage.    I thoroughly irrigated out the site, and once again achieved hemostasis.  I passed one 15 French OG drain deep to the fascia.  I then closed the fascia with first interrupted 0 Vicryl's, and then a running 0 stratafix layer.  I placed a superficial 10 French OG drain above the fascia but below the skin level.  I closed the subcutaneous tissues with 2-0 Vicryl.  I closed skin with a running 3-0 Monocryl and skin glue.  Drain stitches were placed.  Sterile dressings were applied.  The patient was allowed to emerge from anesthesia.  She was transported to PACU in stable condition.    All sponge and needle counts were correct at case conclusion.      POSTOPERATIVE PLAN:    -Activity:    Head of bed flat until 9am on POD 1, then may increase HOB by 30 degrees per hour until achieve 90 degrees. Once achieve 90 degrees with no headache, ok to mobilize out of bed. If spinal headache develops at any time, return to flat bedrest and notify physician.     -Weight Bearing Status:  WBAT once able to get up from bed.  -Bracing:   None  -Antibiotics:     Ancef x24h  -Anticoagulation:   SCDs only  -Pain control:    IV and PO, wean to PO as able  -Drain:     monitor and chart output.  Deep drain (inferior, larger) will be removed POD 2 if output low.  Superficial drain (superior, smaller) will remain for 24 hours after deep drain removal    - Consults:   Internal medicine for medical comanagement, especially with Parkinson's medications  -Dressing:    Reinforce as needed.  -Diet:     ADAT  -Labs:     AM CBC, BMP.  Will ask internal medicine to consider labs to evaluate for bleeding dyscrasia (Von Willebrand, etc...) given her intraoperative propensity for bleeding with difficult hemostasis    -Disposition:    Pending medical stability, PT, anticipate discharge  around POD 3-5  -Follow up:     2 weeks in my clinic        Jose Lr MD

## 2020-10-25 NOTE — BRIEF OP NOTE
"[facility]    Brief Operative Note    Pre-operative diagnosis: Epidural hematoma (H) [S06.4X9A]  Post-operative diagnosis Same as pre-operative diagnosis    Procedure: Procedure(s):  LAMINECTOMIES  L2-L4, REVISION LAMINOTOMY ON RIGHT L4-5, EVACUATION EPIDURAL HEMATOMA, PRIMARY REPAIR OF INCIDENTAL DUROTOMY  Surgeon: Surgeon(s) and Role:     * Jose Lr MD - Primary  Anesthesia: General   Estimated blood loss: 600 cc  Drains:   Deep OG x1 (larger drain, inferior), Superficial OG x1 (smaller drain, superior)  Specimens: * No specimens in log *    Findings:   Large epidural hematoma extending from L4-5 to L2.  Atypically brisk and persistent bleeding of the tissues, controlled with local hemostatics.  Incidental dural tear secondary to the nature of the procedure that was recognized and repaired.        Complications: Incidental durotomy that was recognized and primarily repaired in a watertight fashion    Implants:   Implant Name Type Inv. Item Serial No.  Lot No. LRB No. Used Action   GRAFT DURAGEN 2X2\"  Bone/Tissue/Biologic GRAFT DURAGEN 2X2\"   [employer] [id number] N/A 1 Implanted           "

## 2020-10-25 NOTE — CONSULTS
[facility]  Consult Note - Hospitalist Service     Date of Admission:  10/24/2020  Consult Requested by: Be  Reason for Consult: Post-op medical management.  PRIMARY CARE PROVIDER:    Ana Thorpe    Assessment & Plan   Teagan Rand is a 70 year old female admitted on 10/24/2020.    Past medical history significant for Parkinson's disease, HTN, HLP, SHAHRAM, osteoporosis, B6 deficiency, Vitamin D deficiency, polyclonal gammopathy, recent right L4-5 lateral recess stenosis with radiculopathy s/p right L4-5 hemilaminectomy (10/21/2020) who developed a post-operative lumbar epidural hematoma.  Patient underwent an evacuation of the epidural hematoma with revision of jazz-laminectomy of right L4-5 and laminectomy of L2-L4 and primary repair of incidental durotomy for which the Hospitalist service was consulted to assist with medical management.      Post-operative lumbar epidural hematoma s/p revision of jazz-laminectomy of right L4-5 and laminectomy of L2-L4 and primary repair of incidental durotomy:  POD#0  Patient was POD#3 from right L4-5 hemilaminectomy.  She subsequently developed bilateral low back pain with radiation down both legs.  These symptoms began 10/23/2020 as she was having difficulty ambulating due to pain.  The pain worsened despite use of Oxycodone and she became incontinent of urine.  Dr. Lr (Ortho Spine surgeon) was contacted who advised patient to come to the ED.   -MRI lumbar spine revealed posterior midline epidural fluid collection extending from the upper L2 level to the mid-L5 level likely representing an epidural hematoma that, combined with degenerative changes, results in moderate-severe narrowing of the thecal sac at the L2-L3, L3-L4 and L4-L5 levels.  -Patient was taken to the OR by Dr. Lr.     --Management per Ortho Spine.   --Defer analgesic management, DVT prophylaxis, PT/OT.    --HGB check in the morning.    --Encourage utilization of incentive  spirometer.   --Reviewed PTA medications.  She stated she only take Tylenol for her pain and is not taking Ibuprofen.  She also stated she is not taking fish oil or other blood thinning agents to her knowledge.  Spoke with Dr. Lr regarding possible blood dyscrasia given intraoperative propensity for bleeding with difficult hemostasis.     --Patient's EMR with noted history of polyclonal gammopathy though patient was not aware of this and stated she had never heard the term.     --Patient without family history of bleeding disorders.     --Patient with multiple previous surgeries without any documented bleeding issues intra or post-operatively.     --INR, PTT and Fibrinogen studies all within normal limits.     --Consider Hematology consult.     --Check salicylate level.      Parkinson's Disease:  Patient is compliant with Sinemet, Amantadine, and entacapone.  Patient missed her 4pm doses and there was concern regarding how much of her noon dose she got as she had an episode of vomiting with anesthesia.    -Patient received sinemet close to 2pm.    --Resume PTA medications.      HTN:  --Resume PTA hydrochlorothiazide.  Hold parameters in place.      HLP:  --Resume PTA Crestor.      B6 deficiency:  --Resume replacement.      Osteoporosis:  Patient indicated she takes vitamin D to help with that.    --Supplement held at this time and can be resumed at discharge.      Sleep Apnea:  Patient was unable to tolerate CPAP.    --Supplemental O2 PRN.    --Monitor.      Polyclonal gammopathy:  Noted on EMR.  Patient was not aware of this and stated she had never heard the term.  Unfortunately can not find any documentation pertaining to this diagnosis and it is only listed in her history.      COVID-19 screening, asymptomatic:  PCR negative.      Diet: Advance Diet as Tolerated: Regular Diet Adult   DVT Prophylaxis: Defer to primary service (PCD's)   Bloom Catheter: in place, indication:    Code Status: Full Code      Disposition Plan    Expected discharge: Per Primary service.    Entered: Erlin Haney PA-C 10/24/2020, 9:35 PM        The patient's care was discussed with the Patient.    The patient has been discussed with Dr. Simth, who agrees with the assessment and plan at this time. Dr. Smith will evaluate the patient independently.     At this time, I'd like to thank Dr. Lr for consulting the Hospitalist service.  We will continue to follow.        Erlin Haney PA-C  [facility]    ______________________________________________________________________    Chief Complaint   Post-operative lumbar epidural hematoma.    History is obtained from the patient and EMR.      History of Present Illness   Teagan Rand is a 70 year old female with a past medical history significant for Parkinson's disease, HTN, HLP, SHAHRAM, osteoporosis, B6 deficiency, Vitamin D deficiency, polyclonal gammopathy, recent right L4-5 lateral recess stenosis with radiculopathy s/p right L4-5 hemilaminectomy (10/21/2020) who developed a post-operative lumbar epidural hematoma.  Patient underwent an evacuation of the epidural hematoma with revision of jazz-laminectomy of right L4-5 and laminectomy of L2-L4 and primary repair of incidental durotomy for which the Hospitalist service was consulted to assist with medical management.      Patient was POD#3 from right L4-5 hemilaminectomy.  She subsequently developed bilateral low back pain with radiation down both legs.  These symptoms began 10/23/2020 as she was having difficulty ambulating due to pain.  The pain worsened despite use of Oxycodone and she became incontinent of urine.  Dr. Lr (Ortho Spine surgeon) was contacted who advised patient to come to the ED.     Dr. Ruffin evaluated the patient in the ED.  This included an MRI of the lumbar spine which revealed a posterior midline epidural fluid collection extending from the upper L2 level to the mid-L5  level likely representing an epidural hematoma that, combined with degenerative changes, results in moderate-severe narrowing of the thecal sac at the L2-L3, L3-L4 and L4-L5 levels.  BMP and CBC with differential were within normal limits.  INR within normal limit as was PTT and fibrinogen.  COVID-19 PCR negative.      Patient was taken to the OR by Dr. Lr.  Following the procedure Dr. Lr consulted the Hospitalist service to assist with medical management.  I spoke with Dr. Lr prior to seeing the patient.  Patient was evaluated in the PACU.  She was groggy but able to answer questions.  She indicated she has a wart on her head.  She was told she vomited earlier but denied any nausea currently.  She has been concerned about constipation since the first surgery a few days ago.  She complained of numbness on the outside of her left leg.      Review of Systems   The 10 point Review of Systems is negative other than noted in the HPI.    Past Medical History    I have reviewed this patient's medical history and updated it with pertinent information if needed.   Past Medical History:   Diagnosis Date     Anxiety      Benign paroxysmal positional vertigo      Blood in stool      Dysfunction of both eustachian tubes      Dyskinesia      Hair loss      Hyperlipidemia      Hypertension      Imbalance      Livedo reticularis      Mandibular swelling      Muscle cramps      Obese      Onychomycosis     toenail     Osteoporosis      Parkinson's disease (H)      Polyclonal gammopathy      Sacroiliitis (H)      Sensorineural hearing loss      Sinusitis      Sleep apnea     doesn't use cpap     Thyroid disorder      Tongue symptom      Vitamin B6 deficiency      Vitamin D deficiency        Past Surgical History   I have reviewed this patient's surgical history and updated it with pertinent information if needed.  Past Surgical History:   Procedure Laterality Date     APPENDECTOMY       CHOLECYSTECTOMY       COLONOSCOPY        "GYN SURGERY      hysterectomy     LAMINECTOMY LUMBAR MINIMALLY INVASIVE ONE LEVEL Right 10/21/2020    Procedure: RIGHT LUMBAR 4-5 LAMINOTOMY;  Surgeon: Jose Lr MD;  Location: [facility] OR     ORTHOPEDIC SURGERY      bilateral knee arthroscopy meniscus repair       Social History   I have reviewed this patient's social history and updated it with pertinent information if needed.  [place] .  She denied any history or current use of tobacco products.  She denied alcohol use or illicit drug use.  Since her surgery a few days ago she has been using a walker.    Social History     Tobacco Use     Smoking status: Never Smoker     Smokeless tobacco: Never Used   Substance Use Topics     Alcohol Use     Frequency: Never     Comment: rarely     Drug use: Never       Family History   I have reviewed this patient's family history and updated it with pertinent information if needed.   History reviewed. No pertinent family history.   Father: CAD had a CABG and heart failure.    Sister: Breast Cancer.      Medications   Prior to Admission Medications   Prescriptions Last Dose Informant Patient Reported? Taking?   Amantadine HCl ER (GOCOVRI) 137 MG CP24 10/23/2020 at Unknown time  Yes Yes   Sig: Take 2 tablets by mouth At Bedtime   OVER-THE-COUNTER prn  Yes Yes   Sig: Take 1 tablet by mouth daily as needed \"Generic Allergy Medication\"    UNABLE TO FIND prn  Yes Yes   Sig: Take 25 mg by mouth nightly as needed MEDICATION NAME: allergy medication    carbidopa-levodopa (SINEMET CR)  MG CR tablet 10/23/2020 at Unknown time  Yes Yes   Sig: Take 1 tablet by mouth At Bedtime   carbidopa-levodopa (SINEMET)  MG tablet 10/24/2020 at 2046   Yes Yes   Sig: Take 2 tablets by mouth 2 times daily At 8 AM and 8 PM    (in addition to 12pm, and 4pm doses)    carbidopa-levodopa (SINEMET)  MG tablet 10/23/2020 at 1600  Yes Yes   Sig: Take 2.5 tablets by mouth 2 times daily At " 12 PM and 4 PM    (in addition to 8am and 8pm doses)   entacapone (COMTAN) 200 MG tablet 10/24/2020 at 1354  Yes Yes   Sig: Take 200 mg by mouth 4 times daily At 8am, 12pm, 4pm, and 8pm   gabapentin (NEURONTIN) 300 MG capsule 10/23/2020 at Unknown time  Yes Yes   Sig: Take 300 mg by mouth 3 times daily At 8am, 4pm, 12am   hydrochlorothiazide (HYDRODIURIL) 12.5 MG tablet 10/23/2020 at Unknown time  Yes Yes   Sig: Take 12.5 mg by mouth every morning   melatonin 5 MG tablet 10/23/2020 at Unknown time  Yes Yes   Sig: Take 5 mg by mouth every evening   multivitamin, therapeutic (THERA-VIT) TABS tablet 10/23/2020 at Unknown time  Yes Yes   Sig: Take 1 tablet by mouth every morning   naproxen sodium (ANAPROX) 220 MG tablet prn  Yes Yes   Sig: Take 220 mg by mouth daily as needed for moderate pain   oxyCODONE (ROXICODONE) 5 MG tablet prn  No Yes   Sig: Take 1-2 tablets (5-10 mg) by mouth every 4 hours as needed for moderate to severe pain   pyridOXINE (VITAMIN  B-6) 25 MG tablet 10/23/2020 at Unknown time  Yes Yes   Sig: Take 25 mg by mouth every morning   rosuvastatin (CRESTOR) 20 MG tablet 10/23/2020 at Unknown time  Yes Yes   Sig: Take 20 mg by mouth every evening   senna-docusate (SENOKOT-S/PERICOLACE) 8.6-50 MG tablet prn  No Yes   Sig: Take 1 tablet by mouth 2 times daily as needed for constipation (While on oral opioids.)   vitamin D2 (ERGOCALCIFEROL) 30668 units (1250 mcg) capsule 10/20/2020  Yes Yes   Sig: Take 50,000 Units by mouth once a week On Tuesdays   vitamin D3 (CHOLECALCIFEROL) 50 mcg (2000 units) tablet 10/23/2020 at Unknown time  Yes Yes   Sig: Take 1 tablet by mouth every morning      Facility-Administered Medications: None     Allergies   No Known Allergies    Physical Exam   Temp: 98.2  F (36.8  C) Temp src: Oral BP: (!) 150/85 Pulse: 88   Resp: 16 SpO2: 95 % O2 Device: Nasal cannula Oxygen Delivery: 2 LPM     Constitutional: Awake, alert though groggy, cooperative, no apparent distress.    ENT:  Normocephalic, without obvious abnormality, atraumatic, oral pharynx with moist mucus membranes, tonsils without erythema or exudates.  Eyes pupils are equal, round and reactive to light; extra occular movements intact.  Normal sclera.    Neck: Supple, symmetrical, trachea midline, no adenopathy.  Pulmonary: No increased work of breathing, good air exchange, clear to auscultation bilaterally, no crackles or wheezing.  Cardiovascular: Regular rate and rhythm, normal S1 and S2, no S3 or S4, and no murmur noted.  GI: Normal bowel sounds, soft, non-distended, non-tender.    Skin/Integumen: Clear.  Neuro: CN II-XII grossly intact.  Upper  extremities strength, coordination and sensation intact bilaterally.  Deferred lower extremity assessment due to post-op state.    Psych:  Alert and oriented x 3. Normal affect.  Extremities: No lower extremity edema noted, and calves are non-tender to palpation bilaterally. Dorsal pedal pulses palpable.    : Bloom catheter in place with clear yellow urine in the bag.       Data   I personally reviewed no images or EKG's today.  Results for orders placed or performed during the hospital encounter of 10/24/20 (from the past 24 hour(s))   CBC with platelets differential   Result Value Ref Range    WBC 9.3 4.0 - 11.0 10e9/L    RBC Count 4.25 3.8 - 5.2 10e12/L    Hemoglobin 13.7 11.7 - 15.7 g/dL    Hematocrit 40.4 35.0 - 47.0 %    MCV 95 78 - 100 fl    MCH 32.2 26.5 - 33.0 pg    MCHC 33.9 31.5 - 36.5 g/dL    RDW 12.7 10.0 - 15.0 %    Platelet Count 210 150 - 450 10e9/L    Diff Method Automated Method     % Neutrophils 70.5 %    % Lymphocytes 19.3 %    % Monocytes 8.7 %    % Eosinophils 1.0 %    % Basophils 0.2 %    % Immature Granulocytes 0.3 %    Nucleated RBCs 0 0 /100    Absolute Neutrophil 6.6 1.6 - 8.3 10e9/L    Absolute Lymphocytes 1.8 0.8 - 5.3 10e9/L    Absolute Monocytes 0.8 0.0 - 1.3 10e9/L    Absolute Eosinophils 0.1 0.0 - 0.7 10e9/L    Absolute Basophils 0.0 0.0 - 0.2 10e9/L     Abs Immature Granulocytes 0.0 0 - 0.4 10e9/L    Absolute Nucleated RBC 0.0    INR   Result Value Ref Range    INR 0.98 0.86 - 1.14   Basic metabolic panel   Result Value Ref Range    Sodium 136 133 - 144 mmol/L    Potassium 4.2 3.4 - 5.3 mmol/L    Chloride 106 94 - 109 mmol/L    Carbon Dioxide 26 20 - 32 mmol/L    Anion Gap 4 3 - 14 mmol/L    Glucose 111 (H) 70 - 99 mg/dL    Urea Nitrogen 17 7 - 30 mg/dL    Creatinine 0.79 0.52 - 1.04 mg/dL    GFR Estimate 77 >60 mL/min/[1.73_m2]    GFR Estimate If Black 89 >60 mL/min/[1.73_m2]    Calcium 9.4 8.5 - 10.1 mg/dL   Asymptomatic COVID-19 Virus (Coronavirus) by PCR    Specimen: Nasopharyngeal   Result Value Ref Range    COVID-19 Virus PCR to [facility] - Source Nasopharyngeal     COVID-19 Virus PCR to [facility] - Result       Test received-See reflex to IDDL test SARS CoV2 (COVID-19) Virus RT-PCR   SARS-CoV-2 COVID-19 Virus (Coronavirus) RT-PCR Nasopharyngeal    Specimen: Nasopharyngeal   Result Value Ref Range    SARS-CoV-2 Virus Specimen Source Nasopharyngeal     SARS-CoV-2 PCR Result NEGATIVE     SARS-CoV-2 PCR Comment       Testing was performed using the Xpert Xpress SARS-CoV-2 Assay on the Cepheid Gene-Xpert   Instrument Systems. Additional information about this Emergency Use Authorization (EUA)   assay can be found via the Lab Guide.     MR Lumbar Spine w/o Contrast    Narrative    MRI OF THE LUMBAR SPINE WITHOUT CONTRAST 10/24/2020 12:57 PM     COMPARISON: None available.    HISTORY: Severe bilateral lower back pain, new bilateral lower  extremity weakness constipation, T2 sequences only per spine surgery    TECHNIQUE: Multiplanar, multisequence MRI images of the lumbar spine  were acquired without IV contrast.    FINDINGS: There are five lumbar-type vertebrae for the purposes of  this dictation. There are postoperative changes of a right L4  laminotomy presumably for microdiscectomy.    There is grade 1 degenerative anterolisthesis of L4 upon L5. Alignment  of the  lumbar vertebrae is otherwise normal. Vertebral body heights of  the lumbar spine are normal. No evidence for fracture or pathologic  bony lesion of the lumbar spine.    There is loss of disc height, disc desiccation and posterior disc  bulging to varying degrees at all levels of the lumbar spine.    The tip of the conus medullaris is at the L1-L2 level which is within  normal limits. There is no evidence for intrathecal abnormality.  However, there is a mixed signal intensity epidural collection in the  posterior epidural space extending from the upper L2 level to the mid  L5 level likely representing an epidural hematoma.    Level by level:     T12-L1: Loss of disc height, disc desiccation and mild circumferential  disc bulging. No herniation. Mild facet arthropathy bilaterally. No  spinal canal stenosis. No foraminal stenosis on either side.    L1-L2: Loss of disc height, disc desiccation and mild circumferential  disc bulging. No herniation. Mild facet arthropathy bilaterally. No  spinal canal stenosis. No foraminal stenosis on either side.    L2-L3: Loss of disc height, disc desiccation and mild circumferential  disc bulging. No herniation. Moderate facet arthropathy bilaterally.  No degenerative spinal canal stenosis. Moderate narrowing of the  thecal sac due to the epidural fluid collection. Mild bilateral neural  foraminal narrowing.    L3-L4: Loss of disc height, disc desiccation and moderate  circumferential disc bulging. No herniation. Moderate facet  arthropathy bilaterally. Moderate degenerative spinal canal stenosis  with severe narrowing of the thecal sac due to the combined effect of  the epidural fluid collection. Moderate bilateral neural foraminal  stenosis.    L4-L5: Loss of disc height, disc desiccation and moderate  circumferential disc bulging. No herniation. Severe facet arthropathy  bilaterally. Moderate-severe spinal canal stenosis with severe  narrowing of the thecal sac due to the  combined effect of the  posterior epidural fluid collection. Moderate-severe right foraminal  stenosis. No left foraminal stenosis.    L5-S1: Loss of disc height, disc desiccation and mild circumferential  disc bulging. No herniation. Moderate facet arthropathy bilaterally.  No spinal canal stenosis. No foraminal stenosis on either side.      Impression    IMPRESSION:  1. Diffuse degenerative changes of the lumbar spine as detailed above  with areas of spinal canal and neural foraminal narrowing as detailed  above.  2. Posterior midline epidural fluid collection extending from the  upper L2 level to the mid L5 level likely representing an epidural  hematoma that, combined with degenerative changes, results in  moderate-severe narrowing of the thecal sac at the L2-L3, L3-L4 and  L4-L5 levels.  3. Presumed right L4 laminotomy for discectomy.    NOEMY DEVINE MD   CBC with platelets   Result Value Ref Range    WBC 7.3 4.0 - 11.0 10e9/L    RBC Count 3.92 3.8 - 5.2 10e12/L    Hemoglobin 12.6 11.7 - 15.7 g/dL    Hematocrit 37.2 35.0 - 47.0 %    MCV 95 78 - 100 fl    MCH 32.1 26.5 - 33.0 pg    MCHC 33.9 31.5 - 36.5 g/dL    RDW 12.9 10.0 - 15.0 %    Platelet Count 206 150 - 450 10e9/L   Fibrinogen activity   Result Value Ref Range    Fibrinogen 418 200 - 420 mg/dL   INR   Result Value Ref Range    INR 1.03 0.86 - 1.14   Partial thromboplastin time   Result Value Ref Range    PTT 28 22 - 37 sec

## 2020-10-25 NOTE — PLAN OF CARE
A&Ox4.  VSS.  2 L NC-wean.  CMS intact.  Reports of abdominal and back pain/discomfort, controlled with hilda tylenol and PRN oxycodone, pain has decreased with bowel regime and body position changes.  Sitting up right in bed with no headache.  Denies N/V.  LS dim.  BS active.  Great appetite, tolerating regular diet.  Good oral hydration.  Up with 1, gb, [place] [facility] [place]  BM x2 this shift.  OG drains x2: 15french 22 ml, 10french 8 ml.  Bloom removed @ 1900, DTV.  PVRs.  Discharge pending.

## 2020-10-25 NOTE — PLAN OF CARE
A/Ox4. VSS on 2L O2. Pain control with oxy. CMS intact. OG x2. Incision C/D/I. Passing flatus and active bowel sounds. Tolerating clear liquid diet. Strict flat bedrest until 9am. Bloom patent with good output. Discharge pending.

## 2020-10-25 NOTE — PROGRESS NOTES
"Ortho Progress Note     Subjective:  No acute overnight events. No radicular pain.  No loss of sensation BLE.  Legs feel stronger today. Focal back pain.  Also noting left abdominal fullness and pain, notes she hasn't had a BM since Wednesday, thinks it feels like constipation.    Objective:  BP (!) 140/63 (BP Location: Right arm)   Pulse 100   Temp 98.4  F (36.9  C) (Oral)   Resp 16   Ht 1.778 m (5' 10\")   Wt 106.6 kg (235 lb)   SpO2 95%   BMI 33.72 kg/m    Gen: A&Ox3, no acute distress  CV: 2+ dp/pt pulses, capillary refill < 2sec  Resp: breathing equal and non-labored, no wheezing  MSK:       Spine:   Sensation:      R       L    L3:   Intact   Intact    L4:   Intact   Intact    L5:   Intact   Intact    S1:   Intact   Intact     Motor:     R L    L3: Psoas    4*  4*    L4:   Quad    4* 4*    L4: TA   5 5    L5: EHL    5  5    S1: Eversion/PF  5  5        * = does not fire stronger secondary to exacerbation of back pain    Hemoglobin   Date Value Ref Range Status   10/25/2020 11.5 (L) 11.7 - 15.7 g/dL Final   10/24/2020 12.6 11.7 - 15.7 g/dL Final       Drain:     40/40 deep     10/15 superficial      Assessment/Plan:  70F s/p 10/21 MIS right laminotomy L4-5, readmitted on 10/24 with epidural hematoma now s/p laminectomy L2-4 and revision hemilaminotomy right L4-5    - Constipation:   Fleets enema this morning.  Patient should avoid pushing strongly during this, as stresses the dural repair    -Activity:                                       Head of bed flat until 9am today, then may increase HOB by 30 degrees per hour until achieve 90 degrees. Once achieve 90 degrees with no headache, ok to mobilize out of bed. If spinal headache develops at any time, return to flat bedrest and notify physician.      -Weight Bearing Status:            WBAT once able to get up from bed.  -Bracing:                                      None  -Antibiotics:                                  Ancef x24h  -Anticoagulation:          "               SCDs only  -Pain control:                               IV and PO, wean to PO as able  -Drain:                                          monitor and chart output.  Deep drain (inferior, larger) will be removed POD 2 if output low.  Superficial drain (superior, smaller) will remain for 24 hours after deep drain removal     - Consults:                                    Internal medicine for medical comanagement, especially with Parkinson's medications.  Appreciate assistance, and thoughts regarding unexpected increased intraoperative bleeding propensity.  Patient notes this morning she had been taking Naproxen, which could be a contributor though the intraoperative effect was more than what I would have expected with this alone.    -Dressing:                                     Reinforce as needed.  -Diet:                                              ADAT      -Disposition:                                 Pending medical stability, PT, anticipate discharge around POD 3-5  -Follow up:                                   2 weeks in my clinic    Jose Lr MD  Orthopedic Spine Surgery  [facility]

## 2020-10-25 NOTE — ANESTHESIA POSTPROCEDURE EVALUATION
Patient: Teagan Rand    Procedure(s):  LAMINECTOMIES  L2-L4, REVISION LAMINOTOMY ON RIGHT L4-5, EVACUATION EPIDURAL HEMATOMA, PRIMARY REPAIR OF INCIDENTAL DUROTOMY    Diagnosis:Epidural hematoma (H) [S06.4X9A]  Diagnosis Additional Information: No value filed.    Anesthesia Type:  General    Note:  Anesthesia Post Evaluation    Patient location during evaluation: PACU  Patient participation: Able to fully participate in evaluation  Level of consciousness: awake and alert  Pain management: adequate  Airway patency: patent  Cardiovascular status: acceptable  Respiratory status: acceptable  Hydration status: acceptable  PONV: none     Anesthetic complications: None          Last vitals:  Vitals:    10/24/20 2120 10/24/20 2130 10/24/20 2147   BP: 136/86 (!) 148/71 139/79   Pulse: 90 83 89   Resp: 10 11 18   Temp:   36.8  C (98.2  F)   SpO2: 98% 98% 98%         Electronically Signed By: Lupillo Allen MD  October 24, 2020  9:57 PM

## 2020-10-25 NOTE — PLAN OF CARE
A&Ox4.  VSS.  2L NC, mid 90's.  Incision c/d/I.  2 OG's with serosanguinous output.  Bloom patent.  Flat bedrest until tomorrow at 9am.  IV dilaudid x1 and 5mg oxycodone given for low back pain.  CMS intact, +2 pedal pulses.  Dressing c/d/I.  Tolerated jello and water.  Pills given crushed with applesauce due to flat bedrest.

## 2020-10-25 NOTE — PHARMACY-ADMISSION MEDICATION HISTORY
Pharmacy Medication History  Admission medication history interview status for the 10/24/2020  admission is complete. See EPIC admission navigator for prior to admission medications       Medication history sources: Patient  Location of interview: phone  Medication history source reliability: Good  Adherence assessment: unable to assess    Significant changes made to the medication list:  Modified sinemet 2 tabs to bid from tid. allergy med to prn      Additional medication history information:   Had some meds in the hospital today prior to admit to floor     Medication reconciliation completed by provider prior to medication history? Yes    Time spent in this activity: 20 minutes      Prior to Admission medications    Medication Sig Last Dose Taking? Auth Provider   Amantadine HCl ER (GOCOVRI) 137 MG CP24 Take 2 tablets by mouth At Bedtime 10/23/2020 at Unknown time Yes Reported, Patient   carbidopa-levodopa (SINEMET CR)  MG CR tablet Take 1 tablet by mouth At Bedtime 10/23/2020 at Unknown time Yes Reported, Patient   carbidopa-levodopa (SINEMET)  MG tablet Take 2 tablets by mouth 2 times daily At 8 AM and 8 PM    (in addition to 12pm, and 4pm doses)  10/24/2020 at 2046  Yes Reported, Patient   carbidopa-levodopa (SINEMET)  MG tablet Take 2.5 tablets by mouth 2 times daily At 12 PM and 4 PM    (in addition to 8am and 8pm doses) 10/23/2020 at 1600 Yes Reported, Patient   entacapone (COMTAN) 200 MG tablet Take 200 mg by mouth 4 times daily At 8am, 12pm, 4pm, and 8pm 10/24/2020 at 1354 Yes Reported, Patient   gabapentin (NEURONTIN) 300 MG capsule Take 300 mg by mouth 3 times daily At 8am, 4pm, 12am 10/23/2020 at Unknown time Yes Reported, Patient   hydrochlorothiazide (HYDRODIURIL) 12.5 MG tablet Take 12.5 mg by mouth every morning 10/23/2020 at Unknown time Yes Reported, Patient   melatonin 5 MG tablet Take 5 mg by mouth every evening 10/23/2020 at Unknown time Yes Reported, Patient  "  multivitamin, therapeutic (THERA-VIT) TABS tablet Take 1 tablet by mouth every morning 10/23/2020 at Unknown time Yes Reported, Patient   naproxen sodium (ANAPROX) 220 MG tablet Take 220 mg by mouth daily as needed for moderate pain prn Yes Reported, Patient   OVER-THE-COUNTER Take 1 tablet by mouth daily as needed \"Generic Allergy Medication\"  prn Yes Reported, Patient   oxyCODONE (ROXICODONE) 5 MG tablet Take 1-2 tablets (5-10 mg) by mouth every 4 hours as needed for moderate to severe pain prn Yes Jose Lr MD   pyridOXINE (VITAMIN  B-6) 25 MG tablet Take 25 mg by mouth every morning 10/23/2020 at Unknown time Yes Reported, Patient   rosuvastatin (CRESTOR) 20 MG tablet Take 20 mg by mouth every evening 10/23/2020 at Unknown time Yes Reported, Patient   senna-docusate (SENOKOT-S/PERICOLACE) 8.6-50 MG tablet Take 1 tablet by mouth 2 times daily as needed for constipation (While on oral opioids.) prn Yes Jose Lr MD   UNABLE TO FIND Take 25 mg by mouth nightly as needed MEDICATION NAME: allergy medication  prn Yes Reported, Patient   vitamin D2 (ERGOCALCIFEROL) 41386 units (1250 mcg) capsule Take 50,000 Units by mouth once a week On Tuesdays 10/20/2020 Yes Reported, Patient   vitamin D3 (CHOLECALCIFEROL) 50 mcg (2000 units) tablet Take 1 tablet by mouth every morning 10/23/2020 at Unknown time Yes Reported, Patient       "

## 2020-10-25 NOTE — ANESTHESIA CARE TRANSFER NOTE
Patient: Teagan Rand    Procedure(s):  LAMINECTOMIES  L2-L4, REVISION LAMINOTOMY ON RIGHT L4-5, EVACUATION EPIDURAL HEMATOMA, PRIMARY REPAIR OF INCIDENTAL DUROTOMY    Diagnosis: Epidural hematoma (H) [S06.4X9A]  Diagnosis Additional Information: No value filed.    Anesthesia Type:   General     Note:  Airway :Face Mask  Patient transferred to:PACU  Comments: Transferred to PACU, spontaneous respirations, 10L oxygen via facemask.  All monitors and alarms on and functioning, VSS.  Patient awake, comfortable.  Report to PACU RN.Handoff Report: Identifed the Patient, Identified the Reponsible Provider, Reviewed the pertinent medical history, Discussed the surgical course, Reviewed Intra-OP anesthesia mangement and issues during anesthesia, Set expectations for post-procedure period and Allowed opportunity for questions and acknowledgement of understanding      Vitals: (Last set prior to Anesthesia Care Transfer)    CRNA VITALS  10/24/2020 1910 - 10/24/2020 1948      10/24/2020             Pulse:  82    SpO2:  100 %    Resp Rate (observed):  (!) 2                Electronically Signed By: IRMA Pascal CRNA  October 24, 2020  7:48 PM

## 2020-10-25 NOTE — PROGRESS NOTES
Patient seen in PACU    The patient notes resolution of preoperative BLE pain, numbness.  I discussed with her what was done to decompress her nerves during the surgery, and the need to lay flat overnight secondary to to the durotomy which was repaired.            Spine:   Sensation:      R       L    L3:   Intact   Intact    L4:   Intact   Intact    L5:   Intact   Intact    S1:   Intact   Intact     Motor:     R L    L3: Psoas    4*  4*    L4:   Quad    4* 4*    L4: TA   5 5    L5: EHL    5  5    S1: Eversion/PF  5  5        * = patient notes can't do 5/5 strength secondary to exacerbation of back surgical pain        Continue current cares      Jose Lr MD  Orthopaedic Spine Surgery

## 2020-10-25 NOTE — PROGRESS NOTES
[facility]    Medicine Progress Note - Hospitalist Service       Date of Admission:  10/24/2020  Assessment & Plan       Teagan Rand is a 70 year old with past medical history significant for Parkinson's disease, HTN, SHAHRAM who was admitted on 10/24/20 for treatment of a post-operative lumbar epidural hematoma following R L4-5 hemilaminectomy on 10/21/2020.      She presented with bilateral low back pain with radiation down both legs, beginning on 10/23/2020. MRI lumbar spine revealed posterior midline epidural fluid collection extending from the upper L2 level to the mid-L5 level likely representing an epidural hematoma that, combined with degenerative changes, results in moderate-severe narrowing of the thecal sac at the L2-L3, L3-L4 and L4-L5 levels.    Patient underwent an evacuation of the epidural hematoma with revision of jazz-laminectomy of right L4-5 and laminectomy of L2-L4 and primary repair of incidental durotomy for which the Hospitalist service was consulted to assist with medical management.      S/p L4-L5 hemilaminectomy on 10/20/2020  Post-operative lumbar epidural hematoma   S/p revision of jazz-laminectomy of right L4-5 and laminectomy of L2-L4 and primary repair of incidental durotomy on 10/24/20 by Dr. Lr.       - Routine post-operative care, including pain mgt and DVT prophylaxis, per surgical team.     --Encourage utilization of incentive spirometer.   - continues on PTA Neurontin TID for neuropathic pain.   - Mild drop in hgb post-operatively. Asymptomatic, hemodynamically stable.     Recent Labs   Lab 10/25/20  0640 10/24/20  1843 10/24/20  1031   HGB 11.5* 12.6 13.7         Constipation with LUQ discomfort. Benign abdominal exam.    - Start miralax BID until having BM, continue scheduled senna  - Had one enema today but request another. This was ordered.     Question re: possible bleeding diathesis - Consulting hospitalist spoke with Dr. Lr on 10/24/20  regarding concern for bleeding diathesis given intraoperative propensity for bleeding with difficult hemostasis. Reviewed PTA medications.  She stated she only take Tylenol for her pain. She was not taking ibuprofen, fish oil or other blood thinning agents to her knowledge. Salicylate level was 2. INR, PTT and Fibrinogen wnl.    Patient's EMR with noted history of polyclonal gammopathy though patient was not aware of this and stated she had never heard the term.  Patient without family history of bleeding disorders. Patient with multiple previous surgeries without any documented bleeding issues intra or post-operatively.      - Defer consideration of Hematology Consult to primary service.     Parkinson's Disease - Stable, compliant with medications.     - Continue PTA Sinemet, Amantadine, and entacapone.     HTN  - Continue PTA hydrochlorothiazide.  Hold parameters in place.    - BMP wnl on 10/25/20. K 4.1      HLP:  - Resume PTA Crestor.       B6 deficiency:  - Continue PTA replacement.       Osteoporosis:  - Resume PTA vitamin D at discharge. Defer to PCP regarding further treatment and evaluation.      Sleep Apnea:  Patient was unable to tolerate CPAP.    --Supplemental O2 PRN.    --Monitor.       Polyclonal gammopathy:  Noted on EMR.  Patient was not aware of this and stated she had never heard the term.  Unfortunately can not find any documentation pertaining to this diagnosis and it is only listed in her history.       COVID-19 screening, asymptomatic:  PCR negative.         Diet: Advance Diet as Tolerated: Regular Diet Adult    DVT Prophylaxis: Defer to primary service PCDs  Bloom Catheter: in place, indication: (P) Anesthesia  Code Status: Full Code           Disposition Plan   Expected discharge: Per primary team.   Entered: Pavithra Aguilar MD 10/25/2020, 8:00 AM       The patient's care was discussed with the Bedside Nurse and Patient.    Pavithra Aguilar MD  Hospitalist Service  [facility]  [facility]  Contact information available via [facility] Paging/Directory    ______________________________________________________________________    Interval History   Patient notes she has not had a BM for several days and feels constipated with discomfort in the left upper abdomen. She denies any nausea or vomiting, SOB, CP. She has no other concerns.     Data reviewed today: I reviewed all medications, new labs and imaging results over the last 24 hours. I personally reviewed no images or EKG's today.    Physical Exam   Vital Signs: Temp: 98.4  F (36.9  C) Temp src: Oral BP: (!) 140/63 Pulse: 100   Resp: 16 SpO2: 95 % O2 Device: Nasal cannula Oxygen Delivery: 2 LPM  Weight: 235 lbs 0 oz  Constitutional:  NAD,   Neuropsyche: alert and oriented, answers questions appropriately.   Respiratory:  Breathing comfortably, good air exchange, no wheezes, no crackles.   Cardiovascular:  Regular rate and rhythm, trace edema.  GI:  soft, minimal TTP in the left upper quadrant. ND, BS normal  Skin/Integumen:  No acute rash, bruising or sign of bleeding.         Data   Recent Labs   Lab 10/25/20  0640 10/24/20  1843 10/24/20  1031 10/21/20  0602   WBC 8.5 7.3 9.3  --    HGB 11.5* 12.6 13.7  --    MCV 97 95 95  --     206 210  --    INR  --  1.03 0.98  --      --  136  --    POTASSIUM 4.1  --  4.2 4.2   CHLORIDE 108  --  106  --    CO2 28  --  26  --    BUN 11  --  17  --    CR 0.78  --  0.79  --    ANIONGAP 3  --  4  --    NORMA 8.7  --  9.4  --    *  --  111*  --      Recent Results (from the past 24 hour(s))   MR Lumbar Spine w/o Contrast    Narrative    MRI OF THE LUMBAR SPINE WITHOUT CONTRAST 10/24/2020 12:57 PM     COMPARISON: None available.    HISTORY: Severe bilateral lower back pain, new bilateral lower  extremity weakness constipation, T2 sequences only per spine surgery    TECHNIQUE: Multiplanar, multisequence MRI images of the lumbar spine  were acquired without IV contrast.    FINDINGS: There  are five lumbar-type vertebrae for the purposes of  this dictation. There are postoperative changes of a right L4  laminotomy presumably for microdiscectomy.    There is grade 1 degenerative anterolisthesis of L4 upon L5. Alignment  of the lumbar vertebrae is otherwise normal. Vertebral body heights of  the lumbar spine are normal. No evidence for fracture or pathologic  bony lesion of the lumbar spine.    There is loss of disc height, disc desiccation and posterior disc  bulging to varying degrees at all levels of the lumbar spine.    The tip of the conus medullaris is at the L1-L2 level which is within  normal limits. There is no evidence for intrathecal abnormality.  However, there is a mixed signal intensity epidural collection in the  posterior epidural space extending from the upper L2 level to the mid  L5 level likely representing an epidural hematoma.    Level by level:     T12-L1: Loss of disc height, disc desiccation and mild circumferential  disc bulging. No herniation. Mild facet arthropathy bilaterally. No  spinal canal stenosis. No foraminal stenosis on either side.    L1-L2: Loss of disc height, disc desiccation and mild circumferential  disc bulging. No herniation. Mild facet arthropathy bilaterally. No  spinal canal stenosis. No foraminal stenosis on either side.    L2-L3: Loss of disc height, disc desiccation and mild circumferential  disc bulging. No herniation. Moderate facet arthropathy bilaterally.  No degenerative spinal canal stenosis. Moderate narrowing of the  thecal sac due to the epidural fluid collection. Mild bilateral neural  foraminal narrowing.    L3-L4: Loss of disc height, disc desiccation and moderate  circumferential disc bulging. No herniation. Moderate facet  arthropathy bilaterally. Moderate degenerative spinal canal stenosis  with severe narrowing of the thecal sac due to the combined effect of  the epidural fluid collection. Moderate bilateral neural  foraminal  stenosis.    L4-L5: Loss of disc height, disc desiccation and moderate  circumferential disc bulging. No herniation. Severe facet arthropathy  bilaterally. Moderate-severe spinal canal stenosis with severe  narrowing of the thecal sac due to the combined effect of the  posterior epidural fluid collection. Moderate-severe right foraminal  stenosis. No left foraminal stenosis.    L5-S1: Loss of disc height, disc desiccation and mild circumferential  disc bulging. No herniation. Moderate facet arthropathy bilaterally.  No spinal canal stenosis. No foraminal stenosis on either side.      Impression    IMPRESSION:  1. Diffuse degenerative changes of the lumbar spine as detailed above  with areas of spinal canal and neural foraminal narrowing as detailed  above.  2. Posterior midline epidural fluid collection extending from the  upper L2 level to the mid L5 level likely representing an epidural  hematoma that, combined with degenerative changes, results in  moderate-severe narrowing of the thecal sac at the L2-L3, L3-L4 and  L4-L5 levels.  3. Presumed right L4 laminotomy for discectomy.    NOEMY DEVINE MD     Medications       acetaminophen  975 mg Oral Q8H     Amantadine HCl ER  2 tablet Oral At Bedtime     carbidopa-levodopa  1 tablet Oral At Bedtime     zz extemporaneous template   Oral BID     carbidopa-levodopa  2 tablet Oral Q12H NICHO     ceFAZolin  1 g Intravenous Q8H     entacapone  200 mg Oral 4x Daily     gabapentin  300 mg Oral TID     hydrochlorothiazide  12.5 mg Oral QAM     pyridOXINE  25 mg Oral QAM     rosuvastatin  20 mg Oral QPM     senna-docusate  1 tablet Oral BID    Or     senna-docusate  2 tablet Oral BID     sodium chloride (PF)  3 mL Intracatheter Q8H     sodium phosphate  1 enema Rectal Once

## 2020-10-26 ENCOUNTER — APPOINTMENT (OUTPATIENT)
Dept: PHYSICAL THERAPY | Facility: CLINIC | Age: 70
DRG: 908 | End: 2020-10-26
Attending: ORTHOPAEDIC SURGERY
Payer: MEDICARE

## 2020-10-26 LAB
ANION GAP SERPL CALCULATED.3IONS-SCNC: 4 MMOL/L (ref 3–14)
BUN SERPL-MCNC: 14 MG/DL (ref 7–30)
CALCIUM SERPL-MCNC: 9.3 MG/DL (ref 8.5–10.1)
CHLORIDE SERPL-SCNC: 105 MMOL/L (ref 94–109)
CO2 SERPL-SCNC: 28 MMOL/L (ref 20–32)
CREAT SERPL-MCNC: 0.8 MG/DL (ref 0.52–1.04)
ERYTHROCYTE [DISTWIDTH] IN BLOOD BY AUTOMATED COUNT: 12.8 % (ref 10–15)
GFR SERPL CREATININE-BSD FRML MDRD: 75 ML/MIN/{1.73_M2}
GLUCOSE BLDC GLUCOMTR-MCNC: 103 MG/DL (ref 70–99)
GLUCOSE BLDC GLUCOMTR-MCNC: 120 MG/DL (ref 70–99)
GLUCOSE SERPL-MCNC: 114 MG/DL (ref 70–99)
HCT VFR BLD AUTO: 35.5 % (ref 35–47)
HGB BLD-MCNC: 11.6 G/DL (ref 11.7–15.7)
MCH RBC QN AUTO: 31.4 PG (ref 26.5–33)
MCHC RBC AUTO-ENTMCNC: 32.7 G/DL (ref 31.5–36.5)
MCV RBC AUTO: 96 FL (ref 78–100)
PLATELET # BLD AUTO: 196 10E9/L (ref 150–450)
POTASSIUM SERPL-SCNC: 3.9 MMOL/L (ref 3.4–5.3)
RBC # BLD AUTO: 3.69 10E12/L (ref 3.8–5.2)
SODIUM SERPL-SCNC: 137 MMOL/L (ref 133–144)
WBC # BLD AUTO: 9.8 10E9/L (ref 4–11)

## 2020-10-26 PROCEDURE — 97530 THERAPEUTIC ACTIVITIES: CPT | Mod: GP

## 2020-10-26 PROCEDURE — 85027 COMPLETE CBC AUTOMATED: CPT | Performed by: ORTHOPAEDIC SURGERY

## 2020-10-26 PROCEDURE — 250N000013 HC RX MED GY IP 250 OP 250 PS 637: Performed by: PHYSICIAN ASSISTANT

## 2020-10-26 PROCEDURE — 97110 THERAPEUTIC EXERCISES: CPT | Mod: GP

## 2020-10-26 PROCEDURE — 250N000013 HC RX MED GY IP 250 OP 250 PS 637: Performed by: INTERNAL MEDICINE

## 2020-10-26 PROCEDURE — 36415 COLL VENOUS BLD VENIPUNCTURE: CPT | Performed by: ORTHOPAEDIC SURGERY

## 2020-10-26 PROCEDURE — 80048 BASIC METABOLIC PNL TOTAL CA: CPT | Performed by: ORTHOPAEDIC SURGERY

## 2020-10-26 PROCEDURE — 99232 SBSQ HOSP IP/OBS MODERATE 35: CPT | Performed by: INTERNAL MEDICINE

## 2020-10-26 PROCEDURE — 97161 PT EVAL LOW COMPLEX 20 MIN: CPT | Mod: GP

## 2020-10-26 PROCEDURE — 120N000001 HC R&B MED SURG/OB

## 2020-10-26 PROCEDURE — 250N000013 HC RX MED GY IP 250 OP 250 PS 637: Performed by: ORTHOPAEDIC SURGERY

## 2020-10-26 PROCEDURE — 999N001017 HC STATISTIC GLUCOSE BY METER IP

## 2020-10-26 RX ADMIN — HYDROCHLOROTHIAZIDE 12.5 MG: 12.5 CAPSULE ORAL at 08:26

## 2020-10-26 RX ADMIN — CARBIDOPA AND LEVODOPA 2 TABLET: 25; 100 TABLET ORAL at 08:26

## 2020-10-26 RX ADMIN — Medication: at 16:54

## 2020-10-26 RX ADMIN — ENTACAPONE 200 MG: 200 TABLET, FILM COATED ORAL at 12:19

## 2020-10-26 RX ADMIN — ACETAMINOPHEN 975 MG: 325 TABLET, FILM COATED ORAL at 14:30

## 2020-10-26 RX ADMIN — POLYETHYLENE GLYCOL 3350 17 G: 17 POWDER, FOR SOLUTION ORAL at 08:21

## 2020-10-26 RX ADMIN — POLYETHYLENE GLYCOL 3350 17 G: 17 POWDER, FOR SOLUTION ORAL at 20:23

## 2020-10-26 RX ADMIN — OXYCODONE HYDROCHLORIDE 5 MG: 5 TABLET ORAL at 21:03

## 2020-10-26 RX ADMIN — GABAPENTIN 300 MG: 300 CAPSULE ORAL at 16:55

## 2020-10-26 RX ADMIN — ACETAMINOPHEN 975 MG: 325 TABLET, FILM COATED ORAL at 05:11

## 2020-10-26 RX ADMIN — ROSUVASTATIN CALCIUM 20 MG: 20 TABLET, FILM COATED ORAL at 20:24

## 2020-10-26 RX ADMIN — CARBIDOPA AND LEVODOPA 2 TABLET: 25; 100 TABLET ORAL at 20:24

## 2020-10-26 RX ADMIN — CARBIDOPA AND LEVODOPA 1 TABLET: 50; 200 TABLET, EXTENDED RELEASE ORAL at 23:40

## 2020-10-26 RX ADMIN — ENTACAPONE 200 MG: 200 TABLET, FILM COATED ORAL at 08:26

## 2020-10-26 RX ADMIN — ENTACAPONE 200 MG: 200 TABLET, FILM COATED ORAL at 16:54

## 2020-10-26 RX ADMIN — GABAPENTIN 300 MG: 300 CAPSULE ORAL at 23:40

## 2020-10-26 RX ADMIN — ACETAMINOPHEN 975 MG: 325 TABLET, FILM COATED ORAL at 20:25

## 2020-10-26 RX ADMIN — GABAPENTIN 300 MG: 300 CAPSULE ORAL at 08:26

## 2020-10-26 RX ADMIN — Medication: at 12:18

## 2020-10-26 RX ADMIN — DOCUSATE SODIUM 50 MG AND SENNOSIDES 8.6 MG 1 TABLET: 8.6; 5 TABLET, FILM COATED ORAL at 20:31

## 2020-10-26 RX ADMIN — ENTACAPONE 200 MG: 200 TABLET, FILM COATED ORAL at 20:24

## 2020-10-26 RX ADMIN — Medication 25 MG: at 08:25

## 2020-10-26 ASSESSMENT — ACTIVITIES OF DAILY LIVING (ADL)
ADLS_ACUITY_SCORE: 18
ADLS_ACUITY_SCORE: 16

## 2020-10-26 NOTE — PROGRESS NOTES
[facility]    Medicine Progress Note - Hospitalist Service       Date of Admission:  10/24/2020  Assessment & Plan       Teagan Rand is a 70 year old with past medical history significant for Parkinson's disease, HTN, SHAHRAM who was admitted on 10/24/20 for treatment of a post-operative lumbar epidural hematoma following R L4-5 hemilaminectomy on 10/21/2020.      She presented with bilateral low back pain with radiation down both legs, beginning on 10/23/2020. MRI lumbar spine revealed posterior midline epidural fluid collection extending from the upper L2 level to the mid-L5 level likely representing an epidural hematoma that, combined with degenerative changes, results in moderate-severe narrowing of the thecal sac at the L2-L3, L3-L4 and L4-L5 levels.    Patient underwent an evacuation of the epidural hematoma with revision of jazz-laminectomy of right L4-5 and laminectomy of L2-L4 and primary repair of incidental durotomy for which the Hospitalist service was consulted to assist with medical management.      S/p L4-L5 hemilaminectomy on 10/20/2020  Post-operative lumbar epidural hematoma   S/p revision of jazz-laminectomy of right L4-5 and laminectomy of L2-L4 and primary repair of incidental durotomy on 10/24/20 by Dr. Lr.       - Routine post-operative care, including pain mgt and DVT prophylaxis, per surgical team.     - Encouraged utilization of incentive spirometer.   - Continues on PTA Neurontin TID for neuropathic pain.   - Mild drop in hgb post-operatively. Asymptomatic, hemodynamically stable.     Recent Labs   Lab 10/26/20  0745 10/25/20  0640 10/24/20  1843 10/24/20  1031   HGB 11.6* 11.5* 12.6 13.7         Constipation with LUQ discomfort. Benign abdominal exam.    - Start miralax BID until having BM, continue scheduled senna on 10/25/20  - Having BM on 10/26/20.     Parkinson's Disease - Stable, compliant with medications.     - Continue PTA Sinemet, Amantadine, and  entacapone.     HTN  - Continue PTA hydrochlorothiazide.  Hold parameters in place.    - BMP wnl on 10/25/20. K 4.1      HLP:  - Resume PTA Crestor.       B6 deficiency:  - Continue PTA replacement.       Osteoporosis:  - Resume PTA vitamin D at discharge. Defer to PCP regarding further treatment and evaluation.      Sleep Apnea:  Patient was unable to tolerate CPAP.    --Supplemental O2 PRN.    --Monitor.       Polyclonal gammopathy:  Noted on EMR.  Patient was not aware of this and stated she had never heard the term.  Unfortunately can not find any documentation pertaining to this diagnosis and it is only listed in her history.       COVID-19 screening, asymptomatic:  PCR negative.         Diet: Advance Diet as Tolerated: Regular Diet Adult    DVT Prophylaxis: Defer to primary service PCDs  Bloom Catheter: not present  Code Status: Full Code           Disposition Plan   Expected discharge: Per primary team.   Entered: Pavithra Aguilar MD 10/26/2020, 5:26 PM       The patient's care was discussed with the Bedside Nurse and Patient.    Pavithra Aguilar MD  Hospitalist Service  [facility]  Contact information available via [facility] Paging/Directory    ______________________________________________________________________    Interval History   Events over last 24 hours as outlined above. Patient denies any SOB, CP, abdominal pain, N/V/D.   Having BM, abdominal pain resolved. No SOB. Using IS and notes cough when she does. Decreased appetitie.     Data reviewed today: I reviewed all medications, new labs and imaging results over the last 24 hours. I personally reviewed no images or EKG's today.    Physical Exam   Vital Signs: Temp: 98.8  F (37.1  C) Temp src: Oral BP: 118/67 Pulse: 67   Resp: 20 SpO2: 90 % O2 Device: None (Room air) Oxygen Delivery: 2 LPM  Weight: 235 lbs 0 oz  Constitutional:  NAD,   Neuropsyche: alert and oriented, answers questions appropriately.   Respiratory:  Breathing  comfortably, good air exchange, no wheezes, no crackles.   Cardiovascular:  Regular rate and rhythm, trace edema.  GI:  soft, minimal TTP in the left upper quadrant. ND, BS normal  Skin/Integumen:  No acute rash, bruising or sign of bleeding. - OG with serosanguinous drainage.         Data   Recent Labs   Lab 10/26/20  0745 10/25/20  0640 10/24/20  1843 10/24/20  1031   WBC 9.8 8.5 7.3 9.3   HGB 11.6* 11.5* 12.6 13.7   MCV 96 97 95 95    189 206 210   INR  --   --  1.03 0.98    139  --  136   POTASSIUM 3.9 4.1  --  4.2   CHLORIDE 105 108  --  106   CO2 28 28  --  26   BUN 14 11  --  17   CR 0.80 0.78  --  0.79   ANIONGAP 4 3  --  4   NORMA 9.3 8.7  --  9.4   * 106*  --  111*     No results found for this or any previous visit (from the past 24 hour(s)).  Medications       acetaminophen  975 mg Oral Q8H     Amantadine HCl ER  2 tablet Oral At Bedtime     carbidopa-levodopa  1 tablet Oral At Bedtime     zz extemporaneous template   Oral BID     carbidopa-levodopa  2 tablet Oral Q12H NICHO     entacapone  200 mg Oral 4x Daily     gabapentin  300 mg Oral TID     hydrochlorothiazide  12.5 mg Oral QAM     polyethylene glycol  17 g Oral BID     pyridOXINE  25 mg Oral QAM     rosuvastatin  20 mg Oral QPM     senna-docusate  1 tablet Oral BID    Or     senna-docusate  2 tablet Oral BID     sodium chloride (PF)  3 mL Intracatheter Q8H

## 2020-10-26 NOTE — PROGRESS NOTES
"Ortho Progress Note     Subjective:  Had a BM yesterday, abdominal pain resolved afterwards.  Was able to sit up completely with no spinal headache.  Able to get to commode, states her legs feel atrophied but she can use them to transfer herself.  Bloom removed last evening, is voiding independently.  Slept well overnight, pain very well controlled.  No radicular pain, no symptoms similar to the ones she felt prior to hematoma evacuation.  She feels like recovery is going well at this point.    Objective:  /66 (BP Location: Right arm)   Pulse 83   Temp 98.3  F (36.8  C) (Oral)   Resp 20   Ht 1.778 m (5' 10\")   Wt 106.6 kg (235 lb)   SpO2 97%   BMI 33.72 kg/m    Gen: A&Ox3, no acute distress  CV: 2+ dp/pt pulses, capillary refill < 2sec  Resp: breathing equal and non-labored, no wheezing  MSK:       Spine:   Sensation:      R       L    L3:   Intact   Intact    L4:   Intact   Intact    L5:   Intact   Intact    S1:   Intact   Intact     Motor:     R L    L3: Psoas    5  5    L4:   Quad    5 5    L4: TA   5 5    L5: EHL    5  5    S1: Eversion/PF  5  5        Hemoglobin   Date Value Ref Range Status   10/26/2020 11.6 (L) 11.7 - 15.7 g/dL Final   10/25/2020 11.5 (L) 11.7 - 15.7 g/dL Final       Drain:      30 deep     5 superficial      Assessment/Plan:  70F s/p 10/21 MIS right laminotomy L4-5, readmitted on 10/24 with epidural hematoma now s/p laminectomy L2-4 and revision hemilaminotomy right L4-5.      - Constipation:   Resolved, continue bowel regimen    -Activity:                                       Up with assist   -Weight Bearing Status:              WBAT   -Bracing:                                      None  -Anticoagulation:                        SCDs only  -Pain control:                               IV and PO, wean to PO as able  -Drain:                                          monitor and chart output.  Drains will be removed once scant output given her hematoma.  Inferior larger drain is " deep, smaller superior drain is superficial.     - Consults:                                    Internal medicine for medical comanagement, especially with Parkinson's medications.  Appreciate thoughts regarding unexpected increased intraoperative bleeding propensity.      Intraoperative Bleeding propensity:  No cause found for the bleeding propensity, normal coagulation panel.  The patient notes she had been taking Naproxen postoperatively from her first surgery, which could be the underlying cause of the intraoperative findings.  Given the absence of anomalies on coag panel, and her lack of history of excess bleeding in prior procedures, will not further pursue diagnostic workup.    -Dressing:                                     Reinforce as needed.  -Diet:                                             Regular     -Disposition:                                 Pending medical stability, PT, anticipate discharge around POD 3-5  -Follow up:                                   2 weeks in my clinic      Jose Lr MD  Orthopedic Spine Surgery

## 2020-10-26 NOTE — PROGRESS NOTES
10/26/20 1130   Quick Adds   Type of Visit Initial PT Evaluation   Living Environment   People in home spouse   Current Living Arrangements house   Home Accessibility stairs to enter home;stairs within home   Number of Stairs, Main Entrance 3   Stair Railings, Main Entrance railings on both sides of stairs   Living Environment Comments Laundry in the basement, otherwise 1 level living. walk-in shower   Self-Care   Usual Activity Tolerance fair   Current Activity Tolerance poor   Equipment Currently Used at Home raised toilet seat;walker, rolling;shower chair   Disability/Function   Walking or Climbing Stairs ambulation difficulty, requires equipment;ambulation difficulty, assistance 1 person;stair climbing difficulty, dependent;transferring difficulty, requires equipment;transferring difficulty, assistance 1 person   Toileting yes   Toileting Assistance toileting difficulty, requires equipment;toileting difficulty, assistance 1 person   Fall history within last six months yes   Number of times patient has fallen within last six months 1  (per chart)   Change in Functional Status Since Onset of Current Illness/Injury yes   General Information   Onset of Illness/Injury or Date of Surgery 10/24/20   Referring Physician Jose Lr MD   Patient/Family Therapy Goals Statement (PT) To have less pain and be stronger.    Pertinent History of Current Problem (include personal factors and/or comorbidities that impact the POC) Epidural hematoma post revised L2-5 lami and hematoma evac L2-5, noted incidental durotomy 10-. PD, HTN, SHAHRAM, HLP, osteporosis.    Existing Precautions/Restrictions spinal;fall   Weight-Bearing Status - LUE weight-bearing as tolerated  (all)   General Observations No brace    Cognition   Affect/Mental Status (Cognition) WNL   Follows Commands (Cognition) WNL   Pain Assessment   Patient Currently in Pain Yes, see Vital Sign flowsheet  (team aware)   Posture    Posture Comments WF   Range of  Motion (ROM)   ROM Comment WFL except spinal prex.   Strength   Strength Comments LE L > R weakness functionally - see below. MMT 4+ to 5/5 B (not effectively capturing deficits).   Bed Mobility   Comment (Bed Mobility) SBA supine-sit with HOB up, rail and rolling cues; sit-supine light Chan for legs and technqiue to limited back pain.   Transfers   Transfer Safety Comments SB-CGA sit-stands to RW wtih UE power asist needed, CGA pivot RW given LE weakness L > R.    Gait/Stairs (Locomotion)   Distance in Feet (Required for LE Total Joints) 40' CGA RW L knee unstable with UE WB.   Comment (Gait/Stairs) Stairs not yet appropriate.   Balance   Balance Comments High falls risk - AD, Ax1 and leg(s) unstable.    Coordination   Coordination Comments WFL   Muscle Tone   Muscle Tone Comments WFL   Clinical Impression   Criteria for Skilled Therapeutic Intervention yes, treatment indicated   PT Diagnosis (PT) IMpaired mobility   Influenced by the following impairments Impaired strength, balance, mobility, activity tolerance   Functional limitations due to impairments Impaired independent mobility & living   Clinical Presentation Stable/Uncomplicated   Clinical Decision Making (Complexity) low complexity   Therapy Frequency (PT) 2x/day   Predicted Duration of Therapy Intervention (days/wks) 4 day   Planned Therapy Interventions (PT) balance training;bed mobility training;gait training;home exercise program;lumbar stabilization;neuromuscular re-education;patient/family education;postural re-education;stair training;strengthening;transfer training   Anticipated Equipment Needs at Discharge (PT) walker, rolling;wheelchair;commode chair;gait belt;hospital bed;shower chair   Risk & Benefits of therapy have been explained evaluation/treatment results reviewed;care plan/treatment goals reviewed;risks/benefits reviewed;current/potential barriers reviewed;participants voiced agreement with care plan;participants included;patient   PT  "Discharge Planning    PT Discharge Recommendation (DC Rec) Transitional Care Facility   PT Rationale for DC Rec Unsafe to DC home in current state - inaccessible (legs nearly buckling in room - can't do stairs needed), lacks strength/endurance to navigate & live in a house currently. Pt agreeable to TCU.    PT Brief overview of current status  Bed mob SB-Chan, Tx and 40' CGA RW L knee unstable with UE WB into RW needed to prevent fall, stairs inappropriate.    [facility] [employer] TM \"6 Clicks\"   2016, Trustees of [facility], under license to [employer].  All rights reserved.   6 Clicks Short Forms Basic Mobility Inpatient Short Form   [facility] AM-PAC  \"6 Clicks\" V.2 Basic Mobility Inpatient Short Form   1. Turning from your back to your side while in a flat bed without using bedrails? 4 - None   2. Moving from lying on your back to sitting on the side of a flat bed without using bedrails? 4 - None   3. Moving to and from a bed to a chair (including a wheelchair)? 3 - A Little   4. Standing up from a chair using your arms (e.g., wheelchair, or bedside chair)? 3 - A Little   5. To walk in hospital room? 3 - A Little   6. Climbing 3-5 steps with a railing? 1 - Total   Basic Mobility Raw Score (Score out of 24.Lower scores equate to lower levels of function) 18   Total Evaluation Time   Total Evaluation Time (Minutes) 10     "

## 2020-10-26 NOTE — PLAN OF CARE
POD 2 from a epidural hematoma removal with Dr Lr. CMS intact. Hx of Parkinson's disease. Bowel sounds present, passing gas, tolerating regular diet. VSS. Dressing CDI, Hemovac output 25 ml and 5 ml. Plan to leave the drains in until tomorrow. Pain managed with Tylenol. Up to bathroom with SBA with GB and walker, patient anxious to walk but tolerating well. Ambulated in hallway, patient very encouraged by this. Patient scoring green on the aggression stoplight tool. Plan to go to TCU at discharge.

## 2020-10-26 NOTE — PLAN OF CARE
Pt is here for laminotomies L2-4 and evacuation of epidural hematoma L2-5. A+Ox4. VSS on 2 L overnight. CMS intact. C/O incisional pain- Taking PRN oxycodone, along with scheduled tylenol and cold application for relief. Had enema today, + BM and relief from abdominal discomfort. Ambulating to [facility] with Ax1 GBW. LS diminished. Regular diet, thin liquids. Takes pills whole with water. OG drains x2: Patent. Bloom removed last evening- Voiding appropriately. Last PVR: 159. Discharge pending.

## 2020-10-26 NOTE — PLAN OF CARE
OT: orders received and chart reviewed. Per PT, plan for pt to discharge to TCU. Mobility/therapy needs being met by PT during hospital stay. Will defer OT to next level of care at TCU and complete orders.

## 2020-10-27 ENCOUNTER — APPOINTMENT (OUTPATIENT)
Dept: PHYSICAL THERAPY | Facility: CLINIC | Age: 70
DRG: 908 | End: 2020-10-27
Payer: MEDICARE

## 2020-10-27 LAB
ANION GAP SERPL CALCULATED.3IONS-SCNC: 6 MMOL/L (ref 3–14)
BUN SERPL-MCNC: 16 MG/DL (ref 7–30)
CALCIUM SERPL-MCNC: 9.7 MG/DL (ref 8.5–10.1)
CHLORIDE SERPL-SCNC: 106 MMOL/L (ref 94–109)
CO2 SERPL-SCNC: 27 MMOL/L (ref 20–32)
CREAT SERPL-MCNC: 0.82 MG/DL (ref 0.52–1.04)
ERYTHROCYTE [DISTWIDTH] IN BLOOD BY AUTOMATED COUNT: 12.9 % (ref 10–15)
GFR SERPL CREATININE-BSD FRML MDRD: 73 ML/MIN/{1.73_M2}
GLUCOSE SERPL-MCNC: 97 MG/DL (ref 70–99)
HCT VFR BLD AUTO: 36.2 % (ref 35–47)
HGB BLD-MCNC: 11.8 G/DL (ref 11.7–15.7)
MCH RBC QN AUTO: 31.6 PG (ref 26.5–33)
MCHC RBC AUTO-ENTMCNC: 32.6 G/DL (ref 31.5–36.5)
MCV RBC AUTO: 97 FL (ref 78–100)
PLATELET # BLD AUTO: 214 10E9/L (ref 150–450)
POTASSIUM SERPL-SCNC: 3.7 MMOL/L (ref 3.4–5.3)
RBC # BLD AUTO: 3.73 10E12/L (ref 3.8–5.2)
SODIUM SERPL-SCNC: 139 MMOL/L (ref 133–144)
WBC # BLD AUTO: 8.5 10E9/L (ref 4–11)

## 2020-10-27 PROCEDURE — 99231 SBSQ HOSP IP/OBS SF/LOW 25: CPT | Performed by: INTERNAL MEDICINE

## 2020-10-27 PROCEDURE — 250N000013 HC RX MED GY IP 250 OP 250 PS 637: Performed by: PHYSICIAN ASSISTANT

## 2020-10-27 PROCEDURE — 80048 BASIC METABOLIC PNL TOTAL CA: CPT | Performed by: ORTHOPAEDIC SURGERY

## 2020-10-27 PROCEDURE — 36415 COLL VENOUS BLD VENIPUNCTURE: CPT | Performed by: ORTHOPAEDIC SURGERY

## 2020-10-27 PROCEDURE — 97530 THERAPEUTIC ACTIVITIES: CPT | Mod: GP | Performed by: PHYSICAL THERAPY ASSISTANT

## 2020-10-27 PROCEDURE — 97116 GAIT TRAINING THERAPY: CPT | Mod: GP | Performed by: PHYSICAL THERAPY ASSISTANT

## 2020-10-27 PROCEDURE — 250N000013 HC RX MED GY IP 250 OP 250 PS 637: Performed by: ORTHOPAEDIC SURGERY

## 2020-10-27 PROCEDURE — 120N000001 HC R&B MED SURG/OB

## 2020-10-27 PROCEDURE — 85027 COMPLETE CBC AUTOMATED: CPT | Performed by: ORTHOPAEDIC SURGERY

## 2020-10-27 RX ADMIN — ACETAMINOPHEN 975 MG: 325 TABLET, FILM COATED ORAL at 05:46

## 2020-10-27 RX ADMIN — GABAPENTIN 300 MG: 300 CAPSULE ORAL at 08:32

## 2020-10-27 RX ADMIN — ROSUVASTATIN CALCIUM 20 MG: 20 TABLET, FILM COATED ORAL at 20:01

## 2020-10-27 RX ADMIN — ACETAMINOPHEN 650 MG: 325 TABLET, FILM COATED ORAL at 22:54

## 2020-10-27 RX ADMIN — CARBIDOPA AND LEVODOPA 2 TABLET: 25; 100 TABLET ORAL at 20:01

## 2020-10-27 RX ADMIN — DOCUSATE SODIUM 50 MG AND SENNOSIDES 8.6 MG 1 TABLET: 8.6; 5 TABLET, FILM COATED ORAL at 08:33

## 2020-10-27 RX ADMIN — ENTACAPONE 200 MG: 200 TABLET, FILM COATED ORAL at 16:45

## 2020-10-27 RX ADMIN — Medication: at 12:33

## 2020-10-27 RX ADMIN — Medication: at 16:45

## 2020-10-27 RX ADMIN — Medication 25 MG: at 08:33

## 2020-10-27 RX ADMIN — ACETAMINOPHEN 975 MG: 325 TABLET, FILM COATED ORAL at 14:54

## 2020-10-27 RX ADMIN — ENTACAPONE 200 MG: 200 TABLET, FILM COATED ORAL at 12:31

## 2020-10-27 RX ADMIN — DOCUSATE SODIUM 50 MG AND SENNOSIDES 8.6 MG 2 TABLET: 8.6; 5 TABLET, FILM COATED ORAL at 20:00

## 2020-10-27 RX ADMIN — ENTACAPONE 200 MG: 200 TABLET, FILM COATED ORAL at 08:33

## 2020-10-27 RX ADMIN — CARBIDOPA AND LEVODOPA 2 TABLET: 25; 100 TABLET ORAL at 08:34

## 2020-10-27 RX ADMIN — ENTACAPONE 200 MG: 200 TABLET, FILM COATED ORAL at 20:00

## 2020-10-27 RX ADMIN — HYDROCHLOROTHIAZIDE 12.5 MG: 12.5 CAPSULE ORAL at 08:32

## 2020-10-27 RX ADMIN — GABAPENTIN 300 MG: 300 CAPSULE ORAL at 16:46

## 2020-10-27 ASSESSMENT — ACTIVITIES OF DAILY LIVING (ADL)
ADLS_ACUITY_SCORE: 17
ADLS_ACUITY_SCORE: 19
ADLS_ACUITY_SCORE: 17
ADLS_ACUITY_SCORE: 19
ADLS_ACUITY_SCORE: 17
ADLS_ACUITY_SCORE: 19

## 2020-10-27 NOTE — PLAN OF CARE
Neuro: intact  Resp: LS clear on room air  Cardio: BP WDL  Incision: CDI  OG output: 5 and 15  GI: BS active  : voiding without issue. PVR WDL  Pain: tolerable level only scheduled tylenol

## 2020-10-27 NOTE — PROGRESS NOTES
"Ortho Progress Note     Subjective:  Continues to improve. Pain well controlled. Ambulating, made it 40' with walker yesterday. Still notes some weakness in the legs when up and walking. No return of radicular symptoms.  Urinating independently, though notes stream not as steady as it was prior to hematoma formation.  Is encouraged with her progress so far. PT recommending TCU.        Objective:  /55 (BP Location: Right arm)   Pulse 75   Temp 98.4  F (36.9  C) (Oral)   Resp 16   Ht 1.778 m (5' 10\")   Wt 106.6 kg (235 lb)   SpO2 93%   BMI 33.72 kg/m    Gen: A&Ox3, no acute distress  CV: 2+ dp/pt pulses, capillary refill < 2sec  Resp: breathing equal and non-labored, no wheezing  MSK:       Spine:   Sensation:      R       L    L3:   Intact   Intact    L4:   Intact   Intact    L5:   Intact   Intact    S1:   Intact   Intact     Motor:     R L    L3: Psoas    5  5    L4:   Quad    5 5    L4: TA   5 5    L5: EHL    5  5    S1: Eversion/PF  5  5        Hemoglobin   Date Value Ref Range Status   10/26/2020 11.6 (L) 11.7 - 15.7 g/dL Final   10/25/2020 11.5 (L) 11.7 - 15.7 g/dL Final       Drain:      30/12 deep     5/3 superficial      Assessment/Plan:  70F s/p 10/21 MIS right laminotomy L4-5, readmitted on 10/24 with epidural hematoma now s/p laminectomy L2-4 and revision hemilaminotomy right L4-5 with repair of incidental durotomy.      - Constipation:   Resolved, continue bowel regimen    -Activity:                                       Up with assist   -Weight Bearing Status:              WBAT   -Bracing:                                      None  -Anticoagulation:                        SCDs only  -Pain control:                               IV and PO, wean to PO as able  -Drain:                                          Monitor and chart output.  Drains will be removed once scant output given her hematoma, anticipate likely tomorrow morning given current trend.  Inferior larger drain is deep, smaller " superior drain is superficial.     - Consults:                                    Internal medicine for medical comanagement, especially with Parkinson's medications.      Intraoperative Bleeding propensity:  No cause found for the bleeding propensity, normal coagulation panel.  The patient notes she had been taking Naproxen postoperatively from her first surgery, which could be the underlying cause of the intraoperative findings.  Given the absence of anomalies on coag panel, and her lack of history of excess bleeding in prior procedures, will not further pursue diagnostic workup.    -Dressing:                                     Reinforce as needed.  -Diet:                                             Regular     -Disposition:                                 Pending drain removal, anticipate discharge around POD 4-5 to TCU    -Follow up:                                   2 weeks in my clinic      Jose Lr MD  Orthopedic Spine Surgery

## 2020-10-27 NOTE — PLAN OF CARE
POD 3 from a revision jazz-lami & primary repair of incidental durotomy. A&Ox4. CMS - BLe generally weak. Bowel sounds +x4, + flatus, tolerating regular diet. VSS. Dressing CDI. OG x2 with serosanguinous drainage. Up with A1, GB, W - ambulated in hallway. C/o moderate pain, particularly s/p ambulation decreased with scheduled Tylenol, oxycodone x1 & ice. Pt scoring green on the Aggression Stop Light Tool. Discharge to TCU pending placement.

## 2020-10-27 NOTE — PROGRESS NOTES
[facility]    Medicine Progress Note - Hospitalist Service       Date of Admission:  10/24/2020  Assessment & Plan       Teagan Rand is a 70 year old with past medical history significant for Parkinson's disease, HTN, SHAHRAM who was admitted on 10/24/20 for treatment of a post-operative lumbar epidural hematoma following R L4-5 hemilaminectomy on 10/21/2020.      She presented with bilateral low back pain with radiation down both legs, beginning on 10/23/2020. MRI lumbar spine revealed posterior midline epidural fluid collection extending from the upper L2 level to the mid-L5 level likely representing an epidural hematoma that, combined with degenerative changes, results in moderate-severe narrowing of the thecal sac at the L2-L3, L3-L4 and L4-L5 levels.    Patient underwent an evacuation of the epidural hematoma with revision of jazz-laminectomy of right L4-5 and laminectomy of L2-L4 and primary repair of incidental durotomy for which the Hospitalist service was consulted to assist with medical management.      S/p L4-L5 hemilaminectomy on 10/20/2020  Post-operative lumbar epidural hematoma   S/p revision of jazz-laminectomy of right L4-5 and laminectomy of L2-L4 and primary repair of incidental durotomy on 10/24/20 by Dr. Lr.       - Routine post-operative care, including OG drain, pain mgt and DVT prophylaxis, per surgical team.     - Encouraged utilization of incentive spirometer.   - Continues on PTA Neurontin TID for neuropathic pain.   - Mild drop in hgb post-operatively. Asymptomatic, hemodynamically stable.     Recent Labs   Lab 10/27/20  0750 10/26/20  0745 10/25/20  0640 10/24/20  1843 10/24/20  1031   HGB 11.8 11.6* 11.5* 12.6 13.7         Constipation with LUQ discomfort. Benign abdominal exam.    - Start miralax BID until having BM, continue scheduled senna on 10/25/20  - Having BM abdominal pain resolved.     Parkinson's Disease - Stable, compliant with medications.     -  Continue PTA Sinemet, Amantadine, and entacapone.     HTN  - Continue PTA hydrochlorothiazide.  Hold parameters in place.    - BMP wnl on 10/27/20     HLP:  - Resume PTA Crestor.       B6 deficiency:  - Continue PTA replacement.       Osteoporosis:  - Resume PTA vitamin D at discharge. Defer to PCP regarding further treatment and evaluation.      Sleep Apnea:  Patient was unable to tolerate CPAP.    --Supplemental O2 PRN.    --Monitor.       Polyclonal gammopathy:  Noted on EMR.  Patient was not aware of this and stated she had never heard the term.  Unfortunately can not find any documentation pertaining to this diagnosis and it is only listed in her history.       COVID-19 screening, asymptomatic:  PCR negative.         Diet: Advance Diet as Tolerated: Regular Diet Adult    DVT Prophylaxis: Defer to primary service PCDs  Bloom Catheter: not present  Code Status: Full Code           Disposition Plan   Expected discharge: Per primary team.   Entered: Pavithra Aguilar MD 10/27/2020, 1:18 PM       The patient's care was discussed with the Bedside Nurse and Patient.    Pavithra Aguilar MD  Hospitalist Service  [facility]  Contact information available via [facility] Paging/Directory    ______________________________________________________________________    Interval History   Events over last 24 hours as outlined above. Patient denies any SOB, CP, abdominal pain, N/V/D.   Offers no concerns today.     Data reviewed today: I reviewed all medications, new labs and imaging results over the last 24 hours. I personally reviewed no images or EKG's today.    Physical Exam   Vital Signs: Temp: 98.3  F (36.8  C) Temp src: Oral BP: 133/76 Pulse: 77   Resp: 16 SpO2: 94 % O2 Device: None (Room air)    Weight: 235 lbs 0 oz  Constitutional:  NAD,   Neuropsyche: alert and oriented, answers questions appropriately.   Respiratory:  Breathing comfortably, good air exchange, no wheezes, no crackles.    Cardiovascular:  Regular rate and rhythm, trace edema.  GI:  soft, minimal TTP in the left upper quadrant. ND, BS normal  Skin/Integumen:  No acute rash, bruising or sign of bleeding. - OG with serosanguinous drainage.         Data   Recent Labs   Lab 10/27/20  0750 10/26/20  0745 10/25/20  0640 10/24/20  1843 10/24/20  1031   WBC 8.5 9.8 8.5 7.3 9.3   HGB 11.8 11.6* 11.5* 12.6 13.7   MCV 97 96 97 95 95    196 189 206 210   INR  --   --   --  1.03 0.98    137 139  --  136   POTASSIUM 3.7 3.9 4.1  --  4.2   CHLORIDE 106 105 108  --  106   CO2 27 28 28  --  26   BUN 16 14 11  --  17   CR 0.82 0.80 0.78  --  0.79   ANIONGAP 6 4 3  --  4   NORMA 9.7 9.3 8.7  --  9.4   GLC 97 114* 106*  --  111*     No results found for this or any previous visit (from the past 24 hour(s)).  Medications       acetaminophen  975 mg Oral Q8H     Amantadine HCl ER  2 tablet Oral At Bedtime     carbidopa-levodopa  1 tablet Oral At Bedtime     zz extemporaneous template   Oral BID     carbidopa-levodopa  2 tablet Oral Q12H NICHO     entacapone  200 mg Oral 4x Daily     gabapentin  300 mg Oral TID     hydrochlorothiazide  12.5 mg Oral QAM     polyethylene glycol  17 g Oral BID     pyridOXINE  25 mg Oral QAM     rosuvastatin  20 mg Oral QPM     senna-docusate  1 tablet Oral BID    Or     senna-docusate  2 tablet Oral BID     sodium chloride (PF)  3 mL Intracatheter Q8H

## 2020-10-28 ENCOUNTER — TELEPHONE (OUTPATIENT)
Dept: GERIATRICS | Facility: CLINIC | Age: 70
End: 2020-10-28

## 2020-10-28 ENCOUNTER — APPOINTMENT (OUTPATIENT)
Dept: PHYSICAL THERAPY | Facility: CLINIC | Age: 70
DRG: 908 | End: 2020-10-28
Payer: MEDICARE

## 2020-10-28 VITALS
OXYGEN SATURATION: 94 % | BODY MASS INDEX: 33.64 KG/M2 | HEIGHT: 70 IN | DIASTOLIC BLOOD PRESSURE: 65 MMHG | HEART RATE: 75 BPM | SYSTOLIC BLOOD PRESSURE: 127 MMHG | WEIGHT: 235 LBS | TEMPERATURE: 98.1 F | RESPIRATION RATE: 16 BRPM

## 2020-10-28 PROCEDURE — 250N000013 HC RX MED GY IP 250 OP 250 PS 637: Performed by: PHYSICIAN ASSISTANT

## 2020-10-28 PROCEDURE — 250N000013 HC RX MED GY IP 250 OP 250 PS 637: Performed by: ORTHOPAEDIC SURGERY

## 2020-10-28 PROCEDURE — 97530 THERAPEUTIC ACTIVITIES: CPT | Mod: GP | Performed by: PHYSICAL THERAPY ASSISTANT

## 2020-10-28 PROCEDURE — 97116 GAIT TRAINING THERAPY: CPT | Mod: GP | Performed by: PHYSICAL THERAPY ASSISTANT

## 2020-10-28 RX ORDER — OXYCODONE HYDROCHLORIDE 5 MG/1
5-10 TABLET ORAL EVERY 4 HOURS PRN
Qty: 40 TABLET | Refills: 0 | Status: SHIPPED | OUTPATIENT
Start: 2020-10-28 | End: 2020-11-09

## 2020-10-28 RX ORDER — POLYETHYLENE GLYCOL 3350 17 G/17G
17 POWDER, FOR SOLUTION ORAL 2 TIMES DAILY
Qty: 28 PACKET | Refills: 0 | Status: SHIPPED | OUTPATIENT
Start: 2020-10-28 | End: 2020-11-11

## 2020-10-28 RX ADMIN — CARBIDOPA AND LEVODOPA 2 TABLET: 25; 100 TABLET ORAL at 08:00

## 2020-10-28 RX ADMIN — GABAPENTIN 300 MG: 300 CAPSULE ORAL at 00:36

## 2020-10-28 RX ADMIN — Medication 25 MG: at 08:00

## 2020-10-28 RX ADMIN — CARBIDOPA AND LEVODOPA 1 TABLET: 50; 200 TABLET, EXTENDED RELEASE ORAL at 00:36

## 2020-10-28 RX ADMIN — ENTACAPONE 200 MG: 200 TABLET, FILM COATED ORAL at 07:59

## 2020-10-28 RX ADMIN — OXYCODONE HYDROCHLORIDE 5 MG: 5 TABLET ORAL at 08:00

## 2020-10-28 RX ADMIN — DOCUSATE SODIUM 50 MG AND SENNOSIDES 8.6 MG 2 TABLET: 8.6; 5 TABLET, FILM COATED ORAL at 08:00

## 2020-10-28 RX ADMIN — Medication: at 15:44

## 2020-10-28 RX ADMIN — CARBIDOPA AND LEVODOPA 2 TABLET: 25; 100 TABLET ORAL at 15:43

## 2020-10-28 RX ADMIN — ENTACAPONE 200 MG: 200 TABLET, FILM COATED ORAL at 11:55

## 2020-10-28 RX ADMIN — ENTACAPONE 200 MG: 200 TABLET, FILM COATED ORAL at 15:44

## 2020-10-28 RX ADMIN — GABAPENTIN 300 MG: 300 CAPSULE ORAL at 07:59

## 2020-10-28 RX ADMIN — Medication: at 11:55

## 2020-10-28 RX ADMIN — GABAPENTIN 300 MG: 300 CAPSULE ORAL at 15:44

## 2020-10-28 RX ADMIN — HYDROCHLOROTHIAZIDE 12.5 MG: 12.5 CAPSULE ORAL at 08:00

## 2020-10-28 RX ADMIN — ACETAMINOPHEN 650 MG: 325 TABLET, FILM COATED ORAL at 12:55

## 2020-10-28 ASSESSMENT — ACTIVITIES OF DAILY LIVING (ADL)
ADLS_ACUITY_SCORE: 19
ADLS_ACUITY_SCORE: 18
ADLS_ACUITY_SCORE: 18

## 2020-10-28 NOTE — PROGRESS NOTES
"Ortho Progress Note     Subjective:  Continues to improve walking tolerance.  Now notes no difficulty with voiding at all.  Normal PVRs during day shift yesterday.  Ambulating further distances, feels stronger in her legs.  No radicular symptoms, no saddle anesthesia.          Objective:  /57 (BP Location: Right arm)   Pulse 72   Temp 98.4  F (36.9  C) (Oral)   Resp 16   Ht 1.778 m (5' 10\")   Wt 106.6 kg (235 lb)   SpO2 96%   BMI 33.72 kg/m    Gen: A&Ox3, no acute distress  CV: 2+ dp/pt pulses, capillary refill < 2sec  Resp: breathing equal and non-labored, no wheezing  MSK:       Spine:   Sensation:      R       L    L3:   Intact   Intact    L4:   Intact   Intact    L5:   Intact   Intact    S1:   Intact   Intact     Motor:     R L    L3: Psoas    5  5    L4:   Quad    5 5    L4: TA   5 5    L5: EHL    5  5    S1: Eversion/PF  5  5        Hemoglobin   Date Value Ref Range Status   10/27/2020 11.8 11.7 - 15.7 g/dL Final   10/26/2020 11.6 (L) 11.7 - 15.7 g/dL Final       Drain:      15/10 deep over past 12-hour shifts      5/3 superficial      Assessment/Plan:  70F s/p 10/21 MIS right laminotomy L4-5, readmitted on 10/24 with epidural hematoma now s/p laminectomy L2-4 and revision hemilaminotomy right L4-5 with repair of incidental durotomy.      - Constipation:   Resolved, continue bowel regimen    -Activity:                                       Up with assist   -Weight Bearing Status:              WBAT   -Bracing:                                      None  -Anticoagulation:                        SCDs only  -Pain control:                              PO  -Drain:                                          Drains removed this AM.     - Consults:                                    Internal medicine for medical comanagement, especially with Parkinson's medications.      Intraoperative Bleeding propensity:  No cause found for the bleeding propensity, normal coagulation panel.  The patient notes she had been " taking Naproxen postoperatively from her first surgery, which could be the underlying cause of the intraoperative findings.  Given the absence of anomalies on coag panel, and her lack of history of excess bleeding in prior procedures, will not further pursue diagnostic workup.    -Dressing:                                     Replace as needed  -Diet:                                             Regular     -Disposition:                                 Ok for discharge to TCU per PT recs once placement found    -Follow up:                                   2 weeks in my clinic      Jose Lr MD  Orthopedic Spine Surgery

## 2020-10-28 NOTE — PROGRESS NOTES
Care Management Follow Up Note    Length of Stay (days) 4    Patient plan of care discussed at Interdisciplinary Rounds: yes  Expected Discharge Date: 10/28/20  Concerns to be Addressed:  TCU placement        Anticipated Discharge Disposition:  TCU  Anticipated Discharge Services:    Anticipated Discharge DME:      Plan:    Initial assessment to be completed by RN CC. Orders were placed by physician today. LUCIO sent referral thru DOD to: [place] (pt's only reported choice for TCU).  Awaiting call back from [facility] admissions.     LUCI Morales   [facility]  [phone]    UPDATE@1235: [facility] asking for call back regarding possible admission today.     UPDATE@1300: [facility] has accepted pt for today; 3pm or later.  Pt request HE w/c, accepting costs. Earliest available: 4:15. Orders are in.

## 2020-10-28 NOTE — CONSULTS
Care Management Initial Consult    General Information  Assessment completed with:: Patient, Family, Denise and her  Nitesh ( on speaker phone)  Type of CM/SW Visit: Initial Assessment  Primary Care Provider verified and updated as needed?: Yes  Readmission Within the Last 30 Days: other (see comments)  Return Category: Post-op/Post-procedure complication  Reason for Consult: discharge planning  Advance Care Planning:     Not discussed       Communication Assessment  Patient's communication style: spoken language (English or Bilingual)  Hearing Difficulty or Deaf: yes Wear Glasses or Blind: no    Cognitive  Cognitive/Neuro/Behavioral: WDL  Level of Consciousness: alert  Arousal Level: opens eyes spontaneously  Orientation: oriented x 4  Mood/Behavior: calm, cooperative  Best Language: 0 - No aphasia  Speech: clear, spontaneous, logical    Living Environment:   People in home: spouse     Current living Arrangements: house     plan is for Denise to return home with her  after TCU stay    Family/Social Support:  Care provided by:    Provides care for:       Who is your support system?:   Spouse's Name: Nitesh  Description of Support System: Supportive, Involved  Description of Support System: Supportive, Involved           Description of Support System: Supportive, Involved       Current Resources:   Skilled Home Care Services:  TBD at end of TCU stay  Community Resources:  TBD if needed  Equipment currently used at home: raised toilet seat, walker, rolling, shower chair  Supplies currently used at home:  TBD at end of TCU stay    Employment:  Employment Status:        Not employed at this time  Financial/Environmental Concerns:        none    Lifestyle & Psychosocial Needs:        Socioeconomic History     Marital status:      Spouse name: Not on file     Number of children: Not on file     Years of education: Not on file     Highest education level: Not on file     Tobacco Use     Smoking  status: Never Smoker     Smokeless tobacco: Never Used   Substance and Sexual Activity     Alcohol Use     Frequency: Never     Comment: rarely     Drug use: Never       Functional Status:  Prior to admission patient needed assistance:   Up independent.  Post operatively the pt started having issues with amb.      Mental Health Status:  WDL                            Values/Beliefs:  Spiritual, Cultural Beliefs, Mandaen Practices, Values that affect care:                 Additional Information:  It is recommended by therapies that the pt go to TCU.  The pt and her  Nitesh both are in agreement with this plan.  They have a close friend that is a [employer] and they both want Denise to go to [facility] TCU and they stated their friend is calling people to get her in.  Our SW Octavia place a referral in Discharge on the double for [facility] TCU.  The pt is up with assistance of 1.  The pt's nurse stated she feels the pt's  could give her a ride to [facility] if someone there could come out and help her get into a wheelchair from his car.  The  is will be here later this afternoon and we will discuss his ability to give the pt a ride at that time.    Maday Luna RN, BSN Care Coordinator  [facility]  Mobile: [phone]

## 2020-10-28 NOTE — PLAN OF CARE
Pt is A&Ox4. VSS on RA. Up A1/SBA w/ GB and walker, went on a long walk in the [place] on evenings. Tolerating a regular diet. C/o slight discomfort in lower back that decreased w/ PRN tylenol. PIV SL. Incision CDI, both OG drains removed by MD this AM. Had a large BM on evenings. Is voiding adequately. Neuro's intact. PT following. Plan for pt to discharge today pending TCU placement. Continue to monitor.

## 2020-10-28 NOTE — DISCHARGE SUMMARY
Inpatient Discharge Summary    Patient Name: Teagan Rand MR#: [id number]    Admitting Provider: Jose Lr MD    Admission Date: 10/24/2020     Discharge Date: 10/28/2020                    DETAILS OF HOSPITAL STAY    Hospital Course  70 year old female admitted to [facility] on 10/24/2020 with a primary diagnosis of Epidural hematoma (H).  She had previously undergone surgery 3 days prior in the form of a hemilaminotomy.  She had discharged home after that, did very well for the first couple of days, however she woke on Saturday morning with severe radicular pain in the bilateral lower extremities with associated weakness and urinary retention with overflow incontinence.  She contacted the [facility] orthopedic urgent care line and I was notified.  I called and discussed with the patient and was highly concerned for an epidural hematoma resulting in an evolving cauda equina syndrome.  I urged her to come to the emergency department for evaluation.  I met her at that time and my examination was also consistent with an evolving cauda equina syndrome.  Limited scan MRI was obtained which did in fact demonstrate a compressive epidural hematoma related to the patient's prior operation.    The patient went to the operating room that day for decompression of her epidural hematoma in the form of laminectomies L2-L4, revision laminotomy right L4-5 with bilateral decompression fo the purposes of evacuation of the epidural hematoma.  During that operation an incidental durotomy occurred but was repaired in a watertight fashion.    On postoperative day #1 the patient was gradual I did sit up and had no spinal headache.  Over the course of her hospitalization she continued to improve and her strength, sensation, and ability to void independently.    At the time of discharge the patient was able to ambulate with a walker with assist of 1, was able to void independently with low postvoid residuals, and had  full sensation of the bilateral lower extremities, all of which were substantial improvements over her neurologic status at presentation in the emergency department.    Post-operatively pain was managed with IV and PO agents, and the patient was transitioned to oral pain medications by time of discharge. The patient worked with Physical Therapy and her drains were removed for scant output on 10/28/2020, and she was subsequently cleared for discharge to TCU on 10/28/2020 in stable condition.    Presenting Problem/History of Present Illness  Admitting Diagnosis: Epidural hematoma (H) [S06.4X9A]      Vital Signs at Discharge  Pulse: 75  Resp: 16  BP: 127/65  Temp: 98.1  F (36.7  C)  Weight: 106.6 kg (235 lb)    Operative Procedures Performed  Procedure(s):  LAMINECTOMIES  L2-L4, REVISION LAMINOTOMY ON RIGHT L4-5, EVACUATION EPIDURAL HEMATOMA, PRIMARY REPAIR OF INCIDENTAL DUROTOMY      Consults   OCCUPATIONAL THERAPY ADULT IP CONSULT  PHYSICAL THERAPY ADULT IP CONSULT  HOSPITALIST IP CONSULT  PHYSICAL THERAPY ADULT IP CONSULT  CARE MANAGEMENT / SOCIAL WORK IP CONSULT    Discharge Condition:  Good      PATIENT INSTRUCTIONS    Discharge Medication:  Discharge Medication List as of 10/28/2020  4:59 PM      START taking these medications    Details   polyethylene glycol (MIRALAX) 17 g packet Take 17 g by mouth 2 times daily for 14 days, Disp-28 packet, R-0, Local Print         CONTINUE these medications which have CHANGED    Details   oxyCODONE (ROXICODONE) 5 MG tablet Take 1-2 tablets (5-10 mg) by mouth every 4 hours as needed for moderate to severe pain Take 1 tablet for pain level 3-6, 2 tablets for pain level 7-10., Disp-40 tablet, R-0, Local Print         CONTINUE these medications which have NOT CHANGED    Details   Amantadine HCl ER (GOCOVRI) 137 MG CP24 Take 2 tablets by mouth At Bedtime, Historical      carbidopa-levodopa (SINEMET CR)  MG CR tablet Take 1 tablet by mouth At Bedtime, Historical      !!  "carbidopa-levodopa (SINEMET)  MG tablet Take 2 tablets by mouth 2 times daily At 8 AM and 8 PM    (in addition to 12pm, and 4pm doses) , Historical      !! carbidopa-levodopa (SINEMET)  MG tablet Take 2.5 tablets by mouth 2 times daily At 12 PM and 4 PM    (in addition to 8am and 8pm doses), Historical      entacapone (COMTAN) 200 MG tablet Take 200 mg by mouth 4 times daily At 8am, 12pm, 4pm, and 8pm, Historical      gabapentin (NEURONTIN) 300 MG capsule Take 300 mg by mouth 3 times daily At 8am, 4pm, 12am, Historical      hydrochlorothiazide (HYDRODIURIL) 12.5 MG tablet Take 12.5 mg by mouth every morning, Historical      melatonin 5 MG tablet Take 5 mg by mouth every evening, Historical      multivitamin, therapeutic (THERA-VIT) TABS tablet Take 1 tablet by mouth every morning, Historical      OVER-THE-COUNTER Take 1 tablet by mouth daily as needed \"Generic Allergy Medication\" , Historical      pyridOXINE (VITAMIN  B-6) 25 MG tablet Take 25 mg by mouth every morning, Historical      rosuvastatin (CRESTOR) 20 MG tablet Take 20 mg by mouth every evening, Historical      senna-docusate (SENOKOT-S/PERICOLACE) 8.6-50 MG tablet Take 1 tablet by mouth 2 times daily as needed for constipation (While on oral opioids.), Disp-28 tablet, R-0, Local Print      UNABLE TO FIND Take 25 mg by mouth nightly as needed MEDICATION NAME: allergy medication , Historical      vitamin D2 (ERGOCALCIFEROL) 68656 units (1250 mcg) capsule Take 50,000 Units by mouth once a week On Tuesdays, Historical      vitamin D3 (CHOLECALCIFEROL) 50 mcg (2000 units) tablet Take 1 tablet by mouth every morning, Historical       !! - Potential duplicate medications found. Please discuss with provider.      STOP taking these medications       naproxen sodium (ANAPROX) 220 MG tablet Comments:   Reason for Stopping:                   Jose Lr MD  Orthopaedic Spine Surgery        "

## 2020-10-28 NOTE — PLAN OF CARE
VSS, CMS intact. Up with assist of 1 and walker. Pain well managed with oxycodone and tylenol. Dressing C/D/I. A&OX4. Discharge to TCU at 1615.

## 2020-10-28 NOTE — PROGRESS NOTES
Care Management Discharge Note    Discharge Planning:  Expected Discharge Date: 10/28/20  Expected Time of Departure: 4:15PM(4:15 PM)  Concerns to be Addressed: discharge planning  The pt and her  both wish for her to go to TCU.  They have a close friend that is high up in the [facility] and they are requesting she go to TCU at [facility]    Anticipated Discharge Disposition: Skilled Nursing Facilty  Anticipated Discharge Services:  TCU  Anticipated Discharge DME:      Patient/family educated on Medicare website which has current facility and service quality ratings: yes  Referrals Placed by CM/SW: Post Acute Facilities  Education Provided on the Discharge Plan:  Yes  Patient/Family in Agreement with the Plan: yes  PAS Number: [id number]  Disposition Comments:      Selected Continued Care - Admitted Since 10/24/2020    No services have been selected for the patient.         Additional Information:    Orders, scripts and PAS were faxed to facility. No further SW interventions anticipated.  PAS-RR    D: Per DHS regulation, SW completed and submitted PAS-RR to [place] [employer] Direct Connect via the Senior LinkAge Line.  PAS-RR confirmation # is : [id number]    P: Further questions may be directed to Senior LinkAge Line at #[phone], option #4 for PAS-RR staff.        LUCI Morales   [facility]  [phone]

## 2020-10-29 ENCOUNTER — TRANSFERRED RECORDS (OUTPATIENT)
Dept: HEALTH INFORMATION MANAGEMENT | Facility: CLINIC | Age: 70
End: 2020-10-29

## 2020-10-29 ENCOUNTER — NURSING HOME VISIT (OUTPATIENT)
Dept: GERIATRICS | Facility: CLINIC | Age: 70
End: 2020-10-29
Payer: MEDICARE

## 2020-10-29 VITALS
DIASTOLIC BLOOD PRESSURE: 74 MMHG | OXYGEN SATURATION: 97 % | SYSTOLIC BLOOD PRESSURE: 143 MMHG | HEIGHT: 70 IN | HEART RATE: 86 BPM | WEIGHT: 235 LBS | RESPIRATION RATE: 18 BRPM | BODY MASS INDEX: 33.64 KG/M2 | TEMPERATURE: 97.3 F

## 2020-10-29 DIAGNOSIS — S06.4XAA EPIDURAL HEMATOMA (H): Primary | ICD-10-CM

## 2020-10-29 DIAGNOSIS — F41.1 GAD (GENERALIZED ANXIETY DISORDER): ICD-10-CM

## 2020-10-29 DIAGNOSIS — K59.03 DRUG-INDUCED CONSTIPATION: ICD-10-CM

## 2020-10-29 DIAGNOSIS — G20.A1 PARKINSON'S DISEASE (H): ICD-10-CM

## 2020-10-29 DIAGNOSIS — I10 BENIGN ESSENTIAL HYPERTENSION: ICD-10-CM

## 2020-10-29 DIAGNOSIS — Z98.890 S/P LAMINECTOMY: ICD-10-CM

## 2020-10-29 PROCEDURE — 99310 SBSQ NF CARE HIGH MDM 45: CPT | Performed by: NURSE PRACTITIONER

## 2020-10-29 RX ORDER — ACETAMINOPHEN 500 MG
1000 TABLET ORAL 3 TIMES DAILY
Start: 2020-10-29

## 2020-10-29 NOTE — PROGRESS NOTES
"[facility]  PRIMARY CARE PROVIDER AND CLINIC:  Ana Thorpe PA-C, [facility] [address] [facility] [address] / [facility] [address]  Chief Complaint   Patient presents with     Hospital F/U     [place] [facility] Record Number:  [id number]  Place of Service where encounter took place:  [facility] ([facility]) [[id number]]    Teagan Rand  is a 70 year old  (1950), admitted to the above facility from  [facility]. Hospital stay 10/24/20 through 10/28/20..  Admitted to this facility for  rehab, medical management and nursing care.    HPI:    HPI information obtained from: facility chart records, facility staff, patient report and [facility] chart review.   Brief Summary of Hospital Course:   PMH of Parkinson's disease, anxiety, HTN, HLD, BPPV, obesity who underwent right L4-5 laminotomy on 10/22/20, presented to ED after being sent home with severe pain and developing cauda equina syndrome. Patient was found to have an epidural hematoma and was taken to OR for decompression of hematoma in form of laminectomies L2-L4, revision laminotomy right L4-5, during operation incidental durotomy occured but was repaired.   Patient did well post op and was discharged to TCU  Updates on Status Since Skilled nursing Admission: On exam today patient is sitting up in WC, states pain is rated 3/10 pain located low back/incision, denies pain down legs, states her legs feel week and \"I don't trust my legs to hold me up\" denies fever, chills, cough, congestion, SOB, N/V/D or constipation.     CODE STATUS/ADVANCE DIRECTIVES DISCUSSION:   CPR/Full code   Patient's living condition: lives with spouse  ALLERGIES: Patient has no known allergies.  PAST MEDICAL HISTORY:  has a past medical history of Anxiety, Benign paroxysmal positional vertigo, Blood in stool, Dysfunction of both eustachian tubes, Dyskinesia, Hair loss, Hyperlipidemia, Hypertension, Imbalance, Livedo reticularis, Mandibular swelling, Muscle " cramps, Obese, Onychomycosis, Osteoporosis, Parkinson's disease (H), Polyclonal gammopathy, Sacroiliitis (H), Sensorineural hearing loss, Sinusitis, Sleep apnea, Thyroid disorder, Tongue symptom, Vitamin B6 deficiency, and Vitamin D deficiency.  PAST SURGICAL HISTORY:   has a past surgical history that includes appendectomy; Cholecystectomy; colonoscopy; GYN surgery; orthopedic surgery; Laminectomy lumbar minimally invasive one level (Right, 10/21/2020); and Laminectomy lumbar one level (N/A, 10/24/2020).  FAMILY HISTORY: family history is not on file.  SOCIAL HISTORY:   reports that she has never smoked. She has never used smokeless tobacco. She reports that she does not use drugs.    Post Discharge Medication Reconciliation Status: discharge medications reconciled, continue medications without change    Current Outpatient Medications   Medication Sig Dispense Refill     Amantadine HCl ER (GOCOVRI) 137 MG CP24 Take 2 tablets by mouth At Bedtime       carbidopa-levodopa (SINEMET CR)  MG CR tablet Take 1 tablet by mouth At Bedtime       carbidopa-levodopa (SINEMET)  MG tablet Take 2 tablets by mouth 2 times daily At 8 AM and 8 PM    (in addition to 12pm, and 4pm doses)        carbidopa-levodopa (SINEMET)  MG tablet Take 2.5 tablets by mouth 2 times daily At 12 PM and 4 PM    (in addition to 8am and 8pm doses)       entacapone (COMTAN) 200 MG tablet Take 200 mg by mouth 4 times daily At 8am, 12pm, 4pm, and 8pm       gabapentin (NEURONTIN) 300 MG capsule Take 300 mg by mouth 3 times daily At 8am, 4pm, 12am       hydrochlorothiazide (HYDRODIURIL) 12.5 MG tablet Take 12.5 mg by mouth every morning       melatonin 5 MG tablet Take 5 mg by mouth every evening       multivitamin, therapeutic (THERA-VIT) TABS tablet Take 1 tablet by mouth every morning       oxyCODONE (ROXICODONE) 5 MG tablet Take 1-2 tablets (5-10 mg) by mouth every 4 hours as needed for moderate to severe pain Take 1 tablet for pain level  "3-6, 2 tablets for pain level 7-10. 40 tablet 0     polyethylene glycol (MIRALAX) 17 g packet Take 17 g by mouth 2 times daily for 14 days 28 packet 0     pyridOXINE (VITAMIN  B-6) 25 MG tablet Take 25 mg by mouth every morning       rosuvastatin (CRESTOR) 20 MG tablet Take 20 mg by mouth every evening       senna-docusate (SENOKOT-S/PERICOLACE) 8.6-50 MG tablet Take 1 tablet by mouth 2 times daily as needed for constipation (While on oral opioids.) 28 tablet 0     vitamin D2 (ERGOCALCIFEROL) 12579 units (1250 mcg) capsule Take 50,000 Units by mouth once a week On Tuesdays       vitamin D3 (CHOLECALCIFEROL) 50 mcg (2000 units) tablet Take 1 tablet by mouth every morning       OVER-THE-COUNTER Take 1 tablet by mouth daily as needed \"Generic Allergy Medication\"        UNABLE TO FIND Take 25 mg by mouth nightly as needed MEDICATION NAME: allergy medication            ROS:  10 point ROS of systems including Constitutional, Eyes, Respiratory, Cardiovascular, Gastroenterology, Genitourinary, Integumentary, Musculoskeletal, Psychiatric were all negative except for pertinent positives noted in my HPI.    Vitals:  BP (!) 143/74   Pulse 86   Temp 97.3  F (36.3  C)   Resp 18   Ht 1.778 m (5' 10\")   Wt 106.6 kg (235 lb)   SpO2 97%   BMI 33.72 kg/m    Exam:  GENERAL APPEARANCE:  Alert, in no distress  ENT:  Mouth and posterior oropharynx normal, moist mucous membranes, normal hearing acuity  EYES:  EOM, conjunctivae, lids, pupils and irises normal, PERRL  NECK:  .  RESP:  respiratory effort and palpation of chest normal, lungs clear to auscultation , no respiratory distress  CV:  Palpation and auscultation of heart done , regular rate and rhythm, no murmur, rub, or gallop, no edema  ABDOMEN:  normal bowel sounds, soft, nontender, no hepatosplenomegaly or other masses  M/S:   patient sitting up in WC, able to move all 4 extremities  SKIN:  Inspection of skin and subcutaneous tissue baseline, did not visualize low back " incision.   NEURO:   speech WNL  PSYCH:  affect and mood normal    Lab/Diagnostic data:    Most Recent 3 CBC's:  Recent Labs   Lab Test 10/27/20  0750 10/26/20  0745 10/25/20  0640   WBC 8.5 9.8 8.5   HGB 11.8 11.6* 11.5*   MCV 97 96 97    196 189     Most Recent 3 BMP's:  Recent Labs   Lab Test 10/27/20  0750 10/26/20  0745 10/25/20  0640    137 139   POTASSIUM 3.7 3.9 4.1   CHLORIDE 106 105 108   CO2 27 28 28   BUN 16 14 11   CR 0.82 0.80 0.78   ANIONGAP 6 4 3   NORMA 9.7 9.3 8.7   GLC 97 114* 106*       ASSESSMENT/PLAN:  Epidural hematoma (H)  S/P laminectomy  Acute/ongoing: s/p L4-5 Hemilaminectomy on 10/20/20 then post op epidural hematoma s/p revision of jazz-laminectomy L2-L4 with incidental durotomy 10/24/20 Dr. Lr   PT and OT  Tylenol 1000mg TID scheduled and q hs prn  Oxycodone 5-10mg q 4 hours prn, neurontin 300mg TID,   F/u with spine surgery as directed    Drug-induced constipation  Acute/ongoing: senna s 1 PO BID prn, miralax 17gm BID for 14 days    Benign essential hypertension  Ongoing: vitals daily and prn, BMP follow, continue HCTZ 12.5mg QD    Parkinson's disease (H)  Ongoing: PT and OT no change in Rx    SHAHRAM (generalized anxiety disorder)  Ongoing: offer house pschologist       Orders written by provider at facility  Tylenol 1000mg TID scheduled and q hs prn  BMP and Hgb on Monday    Total time spent with patient visit at the skilled nursing facility was 35 min including patient visit and review of past records. Greater than 50% of total time spent with counseling and coordinating care due to discussed pain control, patient prefers to use tylenol, states oxycodone makes her head feel funny, patient is having weakness in legs, pain is much improved. Education on pain control and use of ice to help decrease inflammation, discussed medications and labs. .     Electronically signed by:  Tonya Lynn Haase, APRN CNP

## 2020-10-29 NOTE — LETTER
"    10/29/2020        RE: Teagan Rand  [address]  [facility] [address]        [facility]  PRIMARY CARE PROVIDER AND CLINIC:  Ana Thorpe PA-C, [facility] [address] / [facility] [address]  Chief Complaint   Patient presents with     Hospital F/U     [facility] Medical Record Number:  [id number]  Place of Service where encounter took place:  [facility] ([facility]) [[id number]]    Teagan Rand  is a 70 year old  (1950), admitted to the above facility from  [facility]. Hospital stay 10/24/20 through 10/28/20..  Admitted to this facility for  rehab, medical management and nursing care.    HPI:    HPI information obtained from: facility chart records, facility staff, patient report and [facility] chart review.   Brief Summary of Hospital Course:   PMH of Parkinson's disease, anxiety, HTN, HLD, BPPV, obesity who underwent right L4-5 laminotomy on 10/22/20, presented to ED after being sent home with severe pain and developing cauda equina syndrome. Patient was found to have an epidural hematoma and was taken to OR for decompression of hematoma in form of laminectomies L2-L4, revision laminotomy right L4-5, during operation incidental durotomy occured but was repaired.   Patient did well post op and was discharged to TCU  Updates on Status Since Skilled nursing Admission: On exam today patient is sitting up in WC, states pain is rated 3/10 pain located low back/incision, denies pain down legs, states her legs feel week and \"I don't trust my legs to hold me up\" denies fever, chills, cough, congestion, SOB, N/V/D or constipation.     CODE STATUS/ADVANCE DIRECTIVES DISCUSSION:   CPR/Full code   Patient's living condition: lives with spouse  ALLERGIES: Patient has no known allergies.  PAST MEDICAL HISTORY:  has a past medical history of Anxiety, Benign paroxysmal positional vertigo, Blood in stool, Dysfunction of both eustachian tubes, Dyskinesia, Hair loss, " Hyperlipidemia, Hypertension, Imbalance, Livedo reticularis, Mandibular swelling, Muscle cramps, Obese, Onychomycosis, Osteoporosis, Parkinson's disease (H), Polyclonal gammopathy, Sacroiliitis (H), Sensorineural hearing loss, Sinusitis, Sleep apnea, Thyroid disorder, Tongue symptom, Vitamin B6 deficiency, and Vitamin D deficiency.  PAST SURGICAL HISTORY:   has a past surgical history that includes appendectomy; Cholecystectomy; colonoscopy; GYN surgery; orthopedic surgery; Laminectomy lumbar minimally invasive one level (Right, 10/21/2020); and Laminectomy lumbar one level (N/A, 10/24/2020).  FAMILY HISTORY: family history is not on file.  SOCIAL HISTORY:   reports that she has never smoked. She has never used smokeless tobacco. She reports that she does not use drugs.    Post Discharge Medication Reconciliation Status: discharge medications reconciled, continue medications without change    Current Outpatient Medications   Medication Sig Dispense Refill     Amantadine HCl ER (GOCOVRI) 137 MG CP24 Take 2 tablets by mouth At Bedtime       carbidopa-levodopa (SINEMET CR)  MG CR tablet Take 1 tablet by mouth At Bedtime       carbidopa-levodopa (SINEMET)  MG tablet Take 2 tablets by mouth 2 times daily At 8 AM and 8 PM    (in addition to 12pm, and 4pm doses)        carbidopa-levodopa (SINEMET)  MG tablet Take 2.5 tablets by mouth 2 times daily At 12 PM and 4 PM    (in addition to 8am and 8pm doses)       entacapone (COMTAN) 200 MG tablet Take 200 mg by mouth 4 times daily At 8am, 12pm, 4pm, and 8pm       gabapentin (NEURONTIN) 300 MG capsule Take 300 mg by mouth 3 times daily At 8am, 4pm, 12am       hydrochlorothiazide (HYDRODIURIL) 12.5 MG tablet Take 12.5 mg by mouth every morning       melatonin 5 MG tablet Take 5 mg by mouth every evening       multivitamin, therapeutic (THERA-VIT) TABS tablet Take 1 tablet by mouth every morning       oxyCODONE (ROXICODONE) 5 MG tablet Take 1-2 tablets (5-10 mg)  "by mouth every 4 hours as needed for moderate to severe pain Take 1 tablet for pain level 3-6, 2 tablets for pain level 7-10. 40 tablet 0     polyethylene glycol (MIRALAX) 17 g packet Take 17 g by mouth 2 times daily for 14 days 28 packet 0     pyridOXINE (VITAMIN  B-6) 25 MG tablet Take 25 mg by mouth every morning       rosuvastatin (CRESTOR) 20 MG tablet Take 20 mg by mouth every evening       senna-docusate (SENOKOT-S/PERICOLACE) 8.6-50 MG tablet Take 1 tablet by mouth 2 times daily as needed for constipation (While on oral opioids.) 28 tablet 0     vitamin D2 (ERGOCALCIFEROL) 02095 units (1250 mcg) capsule Take 50,000 Units by mouth once a week On Tuesdays       vitamin D3 (CHOLECALCIFEROL) 50 mcg (2000 units) tablet Take 1 tablet by mouth every morning       OVER-THE-COUNTER Take 1 tablet by mouth daily as needed \"Generic Allergy Medication\"        UNABLE TO FIND Take 25 mg by mouth nightly as needed MEDICATION NAME: allergy medication            ROS:  10 point ROS of systems including Constitutional, Eyes, Respiratory, Cardiovascular, Gastroenterology, Genitourinary, Integumentary, Musculoskeletal, Psychiatric were all negative except for pertinent positives noted in my HPI.    Vitals:  BP (!) 143/74   Pulse 86   Temp 97.3  F (36.3  C)   Resp 18   Ht 1.778 m (5' 10\")   Wt 106.6 kg (235 lb)   SpO2 97%   BMI 33.72 kg/m    Exam:  GENERAL APPEARANCE:  Alert, in no distress  ENT:  Mouth and posterior oropharynx normal, moist mucous membranes, normal hearing acuity  EYES:  EOM, conjunctivae, lids, pupils and irises normal, PERRL  NECK:  .  RESP:  respiratory effort and palpation of chest normal, lungs clear to auscultation , no respiratory distress  CV:  Palpation and auscultation of heart done , regular rate and rhythm, no murmur, rub, or gallop, no edema  ABDOMEN:  normal bowel sounds, soft, nontender, no hepatosplenomegaly or other masses  M/S:   patient sitting up in WC, able to move all 4 " extremities  SKIN:  Inspection of skin and subcutaneous tissue baseline, did not visualize low back incision.   NEURO:   speech WNL  PSYCH:  affect and mood normal    Lab/Diagnostic data:    Most Recent 3 CBC's:  Recent Labs   Lab Test 10/27/20  0750 10/26/20  0745 10/25/20  0640   WBC 8.5 9.8 8.5   HGB 11.8 11.6* 11.5*   MCV 97 96 97    196 189     Most Recent 3 BMP's:  Recent Labs   Lab Test 10/27/20  0750 10/26/20  0745 10/25/20  0640    137 139   POTASSIUM 3.7 3.9 4.1   CHLORIDE 106 105 108   CO2 27 28 28   BUN 16 14 11   CR 0.82 0.80 0.78   ANIONGAP 6 4 3   NORMA 9.7 9.3 8.7   GLC 97 114* 106*       ASSESSMENT/PLAN:  Epidural hematoma (H)  S/P laminectomy  Acute/ongoing: s/p L4-5 Hemilaminectomy on 10/20/20 then post op epidural hematoma s/p revision of jazz-laminectomy L2-L4 with incidental durotomy 10/24/20 Dr. Lr   PT and OT  Tylenol 1000mg TID scheduled and q hs prn  Oxycodone 5-10mg q 4 hours prn, neurontin 300mg TID,   F/u with spine surgery as directed    Drug-induced constipation  Acute/ongoing: senna s 1 PO BID prn, miralax 17gm BID for 14 days    Benign essential hypertension  Ongoing: vitals daily and prn, BMP follow, continue HCTZ 12.5mg QD    Parkinson's disease (H)  Ongoing: PT and OT no change in Rx    SHAHRAM (generalized anxiety disorder)  Ongoing: offer house pschologist       Orders written by provider at facility  Tylenol 1000mg TID scheduled and q hs prn  BMP and Hgb on Monday    Total time spent with patient visit at the skilled nursing facility was 35 min including patient visit and review of past records. Greater than 50% of total time spent with counseling and coordinating care due to discussed pain control, patient prefers to use tylenol, states oxycodone makes her head feel funny, patient is having weakness in legs, pain is much improved. Education on pain control and use of ice to help decrease inflammation, discussed medications and labs. .     Electronically signed  by:  Tonya Lynn Haase, APRN CNP                         Sincerely,        Tonya Lynn Haase, APRN CNP

## 2020-10-29 NOTE — TELEPHONE ENCOUNTER
Call from nursing staff at [facility]. Patient is new admit following hospitalization due to epidural hematoma. She underwent the following procedure: LAMINECTOMIES  L2-L4, REVISION LAMINOTOMY ON RIGHT L4-5, EVACUATION EPIDURAL HEMATOMA, PRIMARY REPAIR OF INCIDENTAL DUROTOMY  Nursing reports hematuria, no dysuria. She did have catheter removed prior to discharge to TCU.  Plan: push fluids  UA   ARPAN Baltazar  Cell: [phone]

## 2020-11-02 ENCOUNTER — TRANSFERRED RECORDS (OUTPATIENT)
Dept: HEALTH INFORMATION MANAGEMENT | Facility: CLINIC | Age: 70
End: 2020-11-02

## 2020-11-02 ENCOUNTER — NURSING HOME VISIT (OUTPATIENT)
Dept: GERIATRICS | Facility: CLINIC | Age: 70
End: 2020-11-02
Payer: MEDICARE

## 2020-11-02 VITALS
RESPIRATION RATE: 16 BRPM | DIASTOLIC BLOOD PRESSURE: 72 MMHG | SYSTOLIC BLOOD PRESSURE: 121 MMHG | HEART RATE: 86 BPM | OXYGEN SATURATION: 95 % | TEMPERATURE: 96.2 F

## 2020-11-02 DIAGNOSIS — Z98.890 S/P LAMINECTOMY: ICD-10-CM

## 2020-11-02 DIAGNOSIS — S06.4XAA EPIDURAL HEMATOMA (H): Primary | ICD-10-CM

## 2020-11-02 DIAGNOSIS — F41.1 GAD (GENERALIZED ANXIETY DISORDER): ICD-10-CM

## 2020-11-02 DIAGNOSIS — K59.03 DRUG-INDUCED CONSTIPATION: ICD-10-CM

## 2020-11-02 DIAGNOSIS — I10 BENIGN ESSENTIAL HYPERTENSION: ICD-10-CM

## 2020-11-02 DIAGNOSIS — G20.A1 PARKINSON'S DISEASE (H): ICD-10-CM

## 2020-11-02 LAB
ANION GAP SERPL CALCULATED.3IONS-SCNC: 9 MMOL/L (ref 7–16)
BUN SERPL-MCNC: 10 MG/DL (ref 7–26)
CALCIUM SERPL-MCNC: 10.2 MG/DL (ref 8.4–10.4)
CHLORIDE SERPLBLD-SCNC: 104 MMOL/L (ref 98–109)
CO2 SERPL-SCNC: 25 MMOL/L (ref 20–29)
CREAT SERPL-MCNC: 0.61 MG/DL (ref 0.55–1.02)
GFR SERPL CREATININE-BSD FRML MDRD: >60 ML/MIN/1.73M2
GLUCOSE SERPL-MCNC: 104 MG/DL (ref 70–100)
HEMOGLOBIN: 12.7 G/DL (ref 12–15.5)
POTASSIUM SERPL-SCNC: 4.4 MMOL/L (ref 3.5–5.1)
SODIUM SERPL-SCNC: 138 MMOL/L (ref 136–145)

## 2020-11-02 PROCEDURE — 99309 SBSQ NF CARE MODERATE MDM 30: CPT | Performed by: NURSE PRACTITIONER

## 2020-11-02 NOTE — LETTER
11/2/2020        RE: Teagan Rand  [address]  [facility] [address]        [place] [facility]  [place] Medical Record Number:  [id number]  Place of Service where encounter took place:  [facility] ([facility]) [[id number]]  Chief Complaint   Patient presents with     Nursing Home Acute       HPI:    Teagan Rand  is a 70 year old (1950), who is being seen today for an episodic care visit.  HPI information obtained from: facility chart records, facility staff, patient report and [facility] chart review. Today's concern is:  Epidural hematoma: s/p laminectomy: on exam today patient states pain is well controlled, states she has very little pain at rest, rates pain as 2/10 with activity, pain is in back, no radiation down legs, denies fever, chills, cough, congestion, states she is working with therapy, feels she is getting stronger.   HTN: BP as follows: 121/72, 131/72, 141/75 with HR in 70-80 range, denies cp, palpitations, SOB  Parkinson's disease/SHAHRAM: no concerns  Constipation: patient states bowels are working, starting to back off miralax at this time.     Past Medical and Surgical History reviewed in Epic today.    MEDICATIONS:    Current Outpatient Medications   Medication Sig Dispense Refill     acetaminophen (TYLENOL) 500 MG tablet Take 2 tablets (1,000 mg) by mouth 3 times daily       Amantadine HCl ER (GOCOVRI) 137 MG CP24 Take 2 tablets by mouth At Bedtime       carbidopa-levodopa (SINEMET CR)  MG CR tablet Take 1 tablet by mouth At Bedtime       carbidopa-levodopa (SINEMET)  MG tablet Take 2 tablets by mouth 2 times daily At 8 AM and 8 PM    (in addition to 12pm, and 4pm doses)        carbidopa-levodopa (SINEMET)  MG tablet Take 2.5 tablets by mouth 2 times daily At 12 PM and 4 PM    (in addition to 8am and 8pm doses)       entacapone (COMTAN) 200 MG tablet Take 200 mg by mouth 4 times daily At 8am, 12pm, 4pm, and 8pm       gabapentin (NEURONTIN) 300 MG capsule Take  300 mg by mouth 3 times daily At 8am, 4pm, 12am       hydrochlorothiazide (HYDRODIURIL) 12.5 MG tablet Take 12.5 mg by mouth every morning       melatonin 5 MG tablet Take 5 mg by mouth every evening       multivitamin, therapeutic (THERA-VIT) TABS tablet Take 1 tablet by mouth every morning       oxyCODONE (ROXICODONE) 5 MG tablet Take 1-2 tablets (5-10 mg) by mouth every 4 hours as needed for moderate to severe pain Take 1 tablet for pain level 3-6, 2 tablets for pain level 7-10. 40 tablet 0     polyethylene glycol (MIRALAX) 17 g packet Take 17 g by mouth 2 times daily for 14 days 28 packet 0     pyridOXINE (VITAMIN  B-6) 25 MG tablet Take 25 mg by mouth every morning       rosuvastatin (CRESTOR) 20 MG tablet Take 20 mg by mouth every evening       senna-docusate (SENOKOT-S/PERICOLACE) 8.6-50 MG tablet Take 1 tablet by mouth 2 times daily as needed for constipation (While on oral opioids.) 28 tablet 0     vitamin D2 (ERGOCALCIFEROL) 65930 units (1250 mcg) capsule Take 50,000 Units by mouth once a week On Tuesdays       vitamin D3 (CHOLECALCIFEROL) 50 mcg (2000 units) tablet Take 1 tablet by mouth every morning           REVIEW OF SYSTEMS:  10 point ROS of systems including Constitutional, Eyes, Respiratory, Cardiovascular, Gastroenterology, Genitourinary, Integumentary, Musculoskeletal, Psychiatric were all negative except for pertinent positives noted in my HPI.    Objective:  /72   Pulse 86   Temp 96.2  F (35.7  C)   Resp 16   SpO2 95%   Exam:  GENERAL APPEARANCE:  Alert, in no distress, morbidly obese  ENT:  Mouth and posterior oropharynx normal, moist mucous membranes, normal hearing acuity  EYES:  EOM, conjunctivae, lids, pupils and irises normal, PERRL  RESP:  no respiratory distress  CV:  peripheral edema trace+ in LE bilaterally  ABDOMEN:  abdomen roound  M/S:   patient sitting up in WC, able to move all 4 extremities  SKIN:  Inspection of skin and subcutaneous tissue baseline, did not visualize  back incision  NEURO:   speech wnl  PSYCH:  affect and mood normal    Labs:     Most Recent 3 CBC's:  Recent Labs   Lab Test 10/27/20  0750 10/26/20  0745 10/25/20  0640   WBC 8.5 9.8 8.5   HGB 11.8 11.6* 11.5*   MCV 97 96 97    196 189     Most Recent 3 BMP's:  Recent Labs   Lab Test 10/27/20  0750 10/26/20  0745 10/25/20  0640    137 139   POTASSIUM 3.7 3.9 4.1   CHLORIDE 106 105 108   CO2 27 28 28   BUN 16 14 11   CR 0.82 0.80 0.78   ANIONGAP 6 4 3   NORMA 9.7 9.3 8.7   GLC 97 114* 106*       ASSESSMENT/PLAN:  Epidural hematoma (H)  S/P laminectomy  Acute/ongoing: s/p L4-5 Hemilaminectomy on 10/20/20 then post op epidural hematoma s/p revision of jazz-laminectomy L2-L4 with incidental durotomy 10/24/20 Dr. Lr   PT and OT  Tylenol 1000mg TID scheduled and q hs prn  Oxycodone 5-10mg q 4 hours prn, neurontin 300mg TID,   F/u with spine surgery as directed     Drug-induced constipation  Acute/ongoing: senna s 1 PO BID prn, change miralax to 17gm BID prn     Benign essential hypertension  Ongoing: vitals daily and prn, BMP follow, continue HCTZ 12.5mg QD     Parkinson's disease (H)  Ongoing: PT and OT no change in Rx     SHAHRAM (generalized anxiety disorder)  Ongoing: offer house pschologist          Orders written by provider at facility  Change miralax to 17gm BID prn      Electronically signed by:  Tonya Lynn Haase, APRN CNP               Sincerely,        Tonya Lynn Haase, APRN CNP

## 2020-11-02 NOTE — PROGRESS NOTES
[facility]      OUTPATIENT PHYSICAL THERAPY EVALUATION  PLAN OF TREATMENT FOR OUTPATIENT REHABILITATION  (COMPLETE FOR INITIAL CLAIMS ONLY)  Patient's Last Name, First Name, M.I.  YOB: 1950  Teagan Rand                           Provider's Name  [facility] Medical Record No.  [id number]                               Onset Date:  10/24/20   Start of Care Date:  10/26/20      Type:     _X_PT   ___OT   ___SLP Medical Diagnosis:  Post-op Thoracic/Lumbar Surgery                        PT Diagnosis:  IMpaired mobility   Visits from SOC:  1   _________________________________________________________________________________  Plan of Treatment/Functional Goals    Planned Interventions: balance training, bed mobility training, gait training, home exercise program, lumbar stabilization, neuromuscular re-education, patient/family education, postural re-education, stair training, strengthening, transfer training     Goals: See Physical Therapy Goals on Care Plan in [employer] electronic health record.    Therapy Frequency: 2x/day  Predicted Duration of Therapy Intervention: 2 weeks  _________________________________________________________________________________    I CERTIFY THE NEED FOR THESE SERVICES FURNISHED UNDER        THIS PLAN OF TREATMENT AND WHILE UNDER MY CARE     (Physician co-signature of this document indicates review and certification of the therapy plan).                Certification date from: 10/26/20, Certification date to: 11/09/20    Referring Physician: Jose Lr MD            Initial Assessment        See Physical Therapy evaluation dated 10/26/20 in Epic electronic health record.

## 2020-11-02 NOTE — PROGRESS NOTES
[place] [facility]  [place] Medical Record Number:  [id number]  Place of Service where encounter took place:  [facility] ([facility]) [[id number]]  Chief Complaint   Patient presents with     Nursing Home Acute       HPI:    Teagan Rand  is a 70 year old (1950), who is being seen today for an episodic care visit.  HPI information obtained from: facility chart records, facility staff, patient report and [facility] chart review. Today's concern is:  Epidural hematoma: s/p laminectomy: on exam today patient states pain is well controlled, states she has very little pain at rest, rates pain as 2/10 with activity, pain is in back, no radiation down legs, denies fever, chills, cough, congestion, states she is working with therapy, feels she is getting stronger.   HTN: BP as follows: 121/72, 131/72, 141/75 with HR in 70-80 range, denies cp, palpitations, SOB  Parkinson's disease/SHAHRAM: no concerns  Constipation: patient states bowels are working, starting to back off miralax at this time.     Past Medical and Surgical History reviewed in Epic today.    MEDICATIONS:    Current Outpatient Medications   Medication Sig Dispense Refill     acetaminophen (TYLENOL) 500 MG tablet Take 2 tablets (1,000 mg) by mouth 3 times daily       Amantadine HCl ER (GOCOVRI) 137 MG CP24 Take 2 tablets by mouth At Bedtime       carbidopa-levodopa (SINEMET CR)  MG CR tablet Take 1 tablet by mouth At Bedtime       carbidopa-levodopa (SINEMET)  MG tablet Take 2 tablets by mouth 2 times daily At 8 AM and 8 PM    (in addition to 12pm, and 4pm doses)        carbidopa-levodopa (SINEMET)  MG tablet Take 2.5 tablets by mouth 2 times daily At 12 PM and 4 PM    (in addition to 8am and 8pm doses)       entacapone (COMTAN) 200 MG tablet Take 200 mg by mouth 4 times daily At 8am, 12pm, 4pm, and 8pm       gabapentin (NEURONTIN) 300 MG capsule Take 300 mg by mouth 3 times daily At 8am, 4pm, 12am       hydrochlorothiazide  (HYDRODIURIL) 12.5 MG tablet Take 12.5 mg by mouth every morning       melatonin 5 MG tablet Take 5 mg by mouth every evening       multivitamin, therapeutic (THERA-VIT) TABS tablet Take 1 tablet by mouth every morning       oxyCODONE (ROXICODONE) 5 MG tablet Take 1-2 tablets (5-10 mg) by mouth every 4 hours as needed for moderate to severe pain Take 1 tablet for pain level 3-6, 2 tablets for pain level 7-10. 40 tablet 0     polyethylene glycol (MIRALAX) 17 g packet Take 17 g by mouth 2 times daily for 14 days 28 packet 0     pyridOXINE (VITAMIN  B-6) 25 MG tablet Take 25 mg by mouth every morning       rosuvastatin (CRESTOR) 20 MG tablet Take 20 mg by mouth every evening       senna-docusate (SENOKOT-S/PERICOLACE) 8.6-50 MG tablet Take 1 tablet by mouth 2 times daily as needed for constipation (While on oral opioids.) 28 tablet 0     vitamin D2 (ERGOCALCIFEROL) 24132 units (1250 mcg) capsule Take 50,000 Units by mouth once a week On Tuesdays       vitamin D3 (CHOLECALCIFEROL) 50 mcg (2000 units) tablet Take 1 tablet by mouth every morning           REVIEW OF SYSTEMS:  10 point ROS of systems including Constitutional, Eyes, Respiratory, Cardiovascular, Gastroenterology, Genitourinary, Integumentary, Musculoskeletal, Psychiatric were all negative except for pertinent positives noted in my HPI.    Objective:  /72   Pulse 86   Temp 96.2  F (35.7  C)   Resp 16   SpO2 95%   Exam:  GENERAL APPEARANCE:  Alert, in no distress, morbidly obese  ENT:  Mouth and posterior oropharynx normal, moist mucous membranes, normal hearing acuity  EYES:  EOM, conjunctivae, lids, pupils and irises normal, PERRL  RESP:  no respiratory distress  CV:  peripheral edema trace+ in LE bilaterally  ABDOMEN:  abdomen roound  M/S:   patient sitting up in WC, able to move all 4 extremities  SKIN:  Inspection of skin and subcutaneous tissue baseline, did not visualize back incision  NEURO:   speech wnl  PSYCH:  affect and mood  normal    Labs:     Most Recent 3 CBC's:  Recent Labs   Lab Test 10/27/20  0750 10/26/20  0745 10/25/20  0640   WBC 8.5 9.8 8.5   HGB 11.8 11.6* 11.5*   MCV 97 96 97    196 189     Most Recent 3 BMP's:  Recent Labs   Lab Test 10/27/20  0750 10/26/20  0745 10/25/20  0640    137 139   POTASSIUM 3.7 3.9 4.1   CHLORIDE 106 105 108   CO2 27 28 28   BUN 16 14 11   CR 0.82 0.80 0.78   ANIONGAP 6 4 3   NORMA 9.7 9.3 8.7   GLC 97 114* 106*       ASSESSMENT/PLAN:  Epidural hematoma (H)  S/P laminectomy  Acute/ongoing: s/p L4-5 Hemilaminectomy on 10/20/20 then post op epidural hematoma s/p revision of jazz-laminectomy L2-L4 with incidental durotomy 10/24/20 Dr. Lr   PT and OT  Tylenol 1000mg TID scheduled and q hs prn  Oxycodone 5-10mg q 4 hours prn, neurontin 300mg TID,   F/u with spine surgery as directed     Drug-induced constipation  Acute/ongoing: senna s 1 PO BID prn, change miralax to 17gm BID prn     Benign essential hypertension  Ongoing: vitals daily and prn, BMP follow, continue HCTZ 12.5mg QD     Parkinson's disease (H)  Ongoing: PT and OT no change in Rx     SHAHRAM (generalized anxiety disorder)  Ongoing: offer house pschologist          Orders written by provider at facility  Change miralax to 17gm BID prn      Electronically signed by:  Tonya Lynn Haase, IRMA CNP

## 2020-11-03 ASSESSMENT — MIFFLIN-ST. JEOR: SCORE: 1673.94

## 2020-11-04 NOTE — PROGRESS NOTES
[place] [facility]  [place] Medical Record Number:  [id number]  Place of Service where encounter took place:  [facility] ([facility]) [[id number]]  Chief Complaint   Patient presents with     Nursing Home Acute       HPI:    Teagan Rand  is a 70 year old (1950), who is being seen today for an episodic care visit.  HPI information obtained from: facility chart records, facility staff, patient report and [facility] chart review. Today's concern is:  Epidural hematoma: s/p laminectomy: on exam today patient states she has very little pain feels she is getting stronger, she is walking using RW with SBA  HTN: BP as follows: 132/73, 154/87, 173/85 with HR in 80 range, denies cp, palpitations, SOB  Parkinson's disease/SHAHRAM: no concerns  Constipation: patient states bowels are working, starting to back off miralax at this time.        Past Medical and Surgical History reviewed in Epic today.    MEDICATIONS:    Current Outpatient Medications   Medication Sig Dispense Refill     acetaminophen (TYLENOL) 500 MG tablet Take 2 tablets (1,000 mg) by mouth 3 times daily       Amantadine HCl ER (GOCOVRI) 137 MG CP24 Take 2 tablets by mouth At Bedtime       carbidopa-levodopa (SINEMET CR)  MG CR tablet Take 1 tablet by mouth At Bedtime       carbidopa-levodopa (SINEMET)  MG tablet Take 2 tablets by mouth 2 times daily At 8 AM and 8 PM    (in addition to 12pm, and 4pm doses)        carbidopa-levodopa (SINEMET)  MG tablet Take 2.5 tablets by mouth 2 times daily At 12 PM and 4 PM    (in addition to 8am and 8pm doses)       entacapone (COMTAN) 200 MG tablet Take 200 mg by mouth 4 times daily At 8am, 12pm, 4pm, and 8pm       gabapentin (NEURONTIN) 300 MG capsule Take 300 mg by mouth 3 times daily At 8am, 4pm, 12am       hydrochlorothiazide (HYDRODIURIL) 12.5 MG tablet Take 12.5 mg by mouth every morning       melatonin 5 MG tablet Take 5 mg by mouth every evening       multivitamin, therapeutic  "(THERA-VIT) TABS tablet Take 1 tablet by mouth every morning       oxyCODONE (ROXICODONE) 5 MG tablet Take 1-2 tablets (5-10 mg) by mouth every 4 hours as needed for moderate to severe pain Take 1 tablet for pain level 3-6, 2 tablets for pain level 7-10. 40 tablet 0     polyethylene glycol (MIRALAX) 17 g packet Take 17 g by mouth 2 times daily for 14 days 28 packet 0     pyridOXINE (VITAMIN  B-6) 25 MG tablet Take 25 mg by mouth every morning       rosuvastatin (CRESTOR) 20 MG tablet Take 20 mg by mouth every evening       vitamin D2 (ERGOCALCIFEROL) 06307 units (1250 mcg) capsule Take 50,000 Units by mouth once a week On Tuesdays       vitamin D3 (CHOLECALCIFEROL) 50 mcg (2000 units) tablet Take 1 tablet by mouth every morning           REVIEW OF SYSTEMS:  10 point ROS of systems including Constitutional, Eyes, Respiratory, Cardiovascular, Gastroenterology, Genitourinary, Integumentary, Musculoskeletal, Psychiatric were all negative except for pertinent positives noted in my HPI.    Objective:  /73   Pulse 83   Temp 98.1  F (36.7  C)   Resp 16   Ht 1.778 m (5' 10\")   Wt 106.4 kg (234 lb 8 oz)   SpO2 93%   BMI 33.65 kg/m    Exam:  GENERAL APPEARANCE:  Alert, in no distress, morbidly obese  ENT:  Mouth and posterior oropharynx normal, moist mucous membranes, normal hearing acuity  EYES:  EOM, conjunctivae, lids, pupils and irises normal, PERRL  RESP:  no respiratory distress  CV:  peripheral edema trace+ in LE bilaterally  ABDOMEN:  abdomen roound  M/S:   patient sitting up in WC, able to move all 4 extremities  SKIN:  Inspection of skin and subcutaneous tissue baseline, did not visualize back incision  NEURO:   speech wnl  PSYCH:  affect and mood normal    Labs:     Most Recent 3 CBC's:  Recent Labs   Lab Test 11/02/20 10/27/20  0750 10/26/20  0745 10/25/20  0640   WBC  --  8.5 9.8 8.5   HGB 12.7 11.8 11.6* 11.5*   MCV  --  97 96 97   PLT  --  214 196 189     Most Recent 3 BMP's:  Recent Labs   Lab Test " 11/02/20 10/27/20  0750 10/26/20  0745    139 137   POTASSIUM 4.4 3.7 3.9   CHLORIDE 104 106 105   CO2 25 27 28   BUN 10 16 14   CR 0.61 0.82 0.80   ANIONGAP 9 6 4   NORMA 10.2 9.7 9.3   * 97 114*       ASSESSMENT/PLAN:  Epidural hematoma (H)  S/P laminectomy  Acute/ongoing: s/p L4-5 Hemilaminectomy on 10/20/20 then post op epidural hematoma s/p revision of jazz-laminectomy L2-L4 with incidental durotomy 10/24/20 Dr. Lr   PT and OT  Tylenol 1000mg TID scheduled and q hs prn  Oxycodone 5-10mg q 4 hours prn, neurontin 300mg TID,   F/u with spine surgery this afternoon     Drug-induced constipation  Acute/ongoing: senna s 1 PO BID prn, miralax to 17gm BID prn     Benign essential hypertension  Ongoing: vitals daily and prn, BMP follow, continue HCTZ 12.5mg QD     Parkinson's disease (H)  Ongoing: PT and OT no change in Rx     SHAHRAM (generalized anxiety disorder)  Ongoing: offer house pschologist    Orders written by provider at facility  No new orders      Electronically signed by:  Tonya Lynn Haase, APRN CNP

## 2020-11-05 ENCOUNTER — NURSING HOME VISIT (OUTPATIENT)
Dept: GERIATRICS | Facility: CLINIC | Age: 70
End: 2020-11-05
Payer: MEDICARE

## 2020-11-05 VITALS
TEMPERATURE: 98.1 F | HEART RATE: 83 BPM | SYSTOLIC BLOOD PRESSURE: 132 MMHG | DIASTOLIC BLOOD PRESSURE: 73 MMHG | OXYGEN SATURATION: 93 % | BODY MASS INDEX: 33.57 KG/M2 | WEIGHT: 234.5 LBS | RESPIRATION RATE: 16 BRPM | HEIGHT: 70 IN

## 2020-11-05 DIAGNOSIS — G20.A1 PARKINSON'S DISEASE (H): ICD-10-CM

## 2020-11-05 DIAGNOSIS — K59.03 DRUG-INDUCED CONSTIPATION: ICD-10-CM

## 2020-11-05 DIAGNOSIS — S06.4XAA EPIDURAL HEMATOMA (H): Primary | ICD-10-CM

## 2020-11-05 DIAGNOSIS — Z98.890 S/P LAMINECTOMY: ICD-10-CM

## 2020-11-05 DIAGNOSIS — I10 BENIGN ESSENTIAL HYPERTENSION: ICD-10-CM

## 2020-11-05 PROCEDURE — 99309 SBSQ NF CARE MODERATE MDM 30: CPT | Performed by: NURSE PRACTITIONER

## 2020-11-05 NOTE — LETTER
11/5/2020        RE: Teagan Rand  [address]  [facility] [address]        [place] [facility]  [place] Medical Record Number:  [id number]  Place of Service where encounter took place:  [facility] ([facility]) [[id number]]  Chief Complaint   Patient presents with     Nursing Home Acute       HPI:    Teagan Rand  is a 70 year old (1950), who is being seen today for an episodic care visit.  HPI information obtained from: facility chart records, facility staff, patient report and [facility] chart review. Today's concern is:  Epidural hematoma: s/p laminectomy: on exam today patient states she has very little pain feels she is getting stronger, she is walking using RW with SBA  HTN: BP as follows: 132/73, 154/87, 173/85 with HR in 80 range, denies cp, palpitations, SOB  Parkinson's disease/SHAHRAM: no concerns  Constipation: patient states bowels are working, starting to back off miralax at this time.        Past Medical and Surgical History reviewed in Epic today.    MEDICATIONS:    Current Outpatient Medications   Medication Sig Dispense Refill     acetaminophen (TYLENOL) 500 MG tablet Take 2 tablets (1,000 mg) by mouth 3 times daily       Amantadine HCl ER (GOCOVRI) 137 MG CP24 Take 2 tablets by mouth At Bedtime       carbidopa-levodopa (SINEMET CR)  MG CR tablet Take 1 tablet by mouth At Bedtime       carbidopa-levodopa (SINEMET)  MG tablet Take 2 tablets by mouth 2 times daily At 8 AM and 8 PM    (in addition to 12pm, and 4pm doses)        carbidopa-levodopa (SINEMET)  MG tablet Take 2.5 tablets by mouth 2 times daily At 12 PM and 4 PM    (in addition to 8am and 8pm doses)       entacapone (COMTAN) 200 MG tablet Take 200 mg by mouth 4 times daily At 8am, 12pm, 4pm, and 8pm       gabapentin (NEURONTIN) 300 MG capsule Take 300 mg by mouth 3 times daily At 8am, 4pm, 12am       hydrochlorothiazide (HYDRODIURIL) 12.5 MG tablet Take 12.5 mg by mouth every morning       melatonin 5 MG  "tablet Take 5 mg by mouth every evening       multivitamin, therapeutic (THERA-VIT) TABS tablet Take 1 tablet by mouth every morning       oxyCODONE (ROXICODONE) 5 MG tablet Take 1-2 tablets (5-10 mg) by mouth every 4 hours as needed for moderate to severe pain Take 1 tablet for pain level 3-6, 2 tablets for pain level 7-10. 40 tablet 0     polyethylene glycol (MIRALAX) 17 g packet Take 17 g by mouth 2 times daily for 14 days 28 packet 0     pyridOXINE (VITAMIN  B-6) 25 MG tablet Take 25 mg by mouth every morning       rosuvastatin (CRESTOR) 20 MG tablet Take 20 mg by mouth every evening       vitamin D2 (ERGOCALCIFEROL) 18086 units (1250 mcg) capsule Take 50,000 Units by mouth once a week On Tuesdays       vitamin D3 (CHOLECALCIFEROL) 50 mcg (2000 units) tablet Take 1 tablet by mouth every morning           REVIEW OF SYSTEMS:  10 point ROS of systems including Constitutional, Eyes, Respiratory, Cardiovascular, Gastroenterology, Genitourinary, Integumentary, Musculoskeletal, Psychiatric were all negative except for pertinent positives noted in my HPI.    Objective:  /73   Pulse 83   Temp 98.1  F (36.7  C)   Resp 16   Ht 1.778 m (5' 10\")   Wt 106.4 kg (234 lb 8 oz)   SpO2 93%   BMI 33.65 kg/m    Exam:  GENERAL APPEARANCE:  Alert, in no distress, morbidly obese  ENT:  Mouth and posterior oropharynx normal, moist mucous membranes, normal hearing acuity  EYES:  EOM, conjunctivae, lids, pupils and irises normal, PERRL  RESP:  no respiratory distress  CV:  peripheral edema trace+ in LE bilaterally  ABDOMEN:  abdomen roound  M/S:   patient sitting up in WC, able to move all 4 extremities  SKIN:  Inspection of skin and subcutaneous tissue baseline, did not visualize back incision  NEURO:   speech wnl  PSYCH:  affect and mood normal    Labs:     Most Recent 3 CBC's:  Recent Labs   Lab Test 11/02/20 10/27/20  0750 10/26/20  0745 10/25/20  0640   WBC  --  8.5 9.8 8.5   HGB 12.7 11.8 11.6* 11.5*   MCV  --  97 96 97 "   PLT  --  214 196 189     Most Recent 3 BMP's:  Recent Labs   Lab Test 11/02/20 10/27/20  0750 10/26/20  0745    139 137   POTASSIUM 4.4 3.7 3.9   CHLORIDE 104 106 105   CO2 25 27 28   BUN 10 16 14   CR 0.61 0.82 0.80   ANIONGAP 9 6 4   NORMA 10.2 9.7 9.3   * 97 114*       ASSESSMENT/PLAN:  Epidural hematoma (H)  S/P laminectomy  Acute/ongoing: s/p L4-5 Hemilaminectomy on 10/20/20 then post op epidural hematoma s/p revision of jazz-laminectomy L2-L4 with incidental durotomy 10/24/20 Dr. Lr   PT and OT  Tylenol 1000mg TID scheduled and q hs prn  Oxycodone 5-10mg q 4 hours prn, neurontin 300mg TID,   F/u with spine surgery this afternoon     Drug-induced constipation  Acute/ongoing: senna s 1 PO BID prn, miralax to 17gm BID prn     Benign essential hypertension  Ongoing: vitals daily and prn, BMP follow, continue HCTZ 12.5mg QD     Parkinson's disease (H)  Ongoing: PT and OT no change in Rx     SHAHRAM (generalized anxiety disorder)  Ongoing: offer house pschologist    Orders written by provider at facility  No new orders      Electronically signed by:  Tonya Lynn Haase, APRN CNP               Sincerely,        Tonya Lynn Haase, APRN CNP

## 2020-11-08 ASSESSMENT — MIFFLIN-ST. JEOR: SCORE: 1653.07

## 2020-11-09 ENCOUNTER — DISCHARGE SUMMARY NURSING HOME (OUTPATIENT)
Dept: GERIATRICS | Facility: CLINIC | Age: 70
End: 2020-11-09
Payer: MEDICARE

## 2020-11-09 VITALS
HEART RATE: 79 BPM | RESPIRATION RATE: 16 BRPM | OXYGEN SATURATION: 96 % | BODY MASS INDEX: 32.91 KG/M2 | HEIGHT: 70 IN | TEMPERATURE: 97.4 F | SYSTOLIC BLOOD PRESSURE: 142 MMHG | DIASTOLIC BLOOD PRESSURE: 74 MMHG | WEIGHT: 229.9 LBS

## 2020-11-09 DIAGNOSIS — G20.A1 PARKINSON'S DISEASE (H): ICD-10-CM

## 2020-11-09 DIAGNOSIS — I10 BENIGN ESSENTIAL HYPERTENSION: ICD-10-CM

## 2020-11-09 DIAGNOSIS — K59.03 DRUG-INDUCED CONSTIPATION: ICD-10-CM

## 2020-11-09 DIAGNOSIS — S06.4XAA EPIDURAL HEMATOMA (H): Primary | ICD-10-CM

## 2020-11-09 DIAGNOSIS — Z98.890 S/P LAMINECTOMY: ICD-10-CM

## 2020-11-09 PROCEDURE — 99316 NF DSCHRG MGMT 30 MIN+: CPT | Performed by: NURSE PRACTITIONER

## 2020-11-09 NOTE — LETTER
11/9/2020        RE: Teagan Rand  [address]  [place] [address]        [facility] DISCHARGE SUMMARY  PATIENT'S NAME: Teagan Rand  YOB: 1950  MEDICAL RECORD NUMBER:  [id number]  Place of Service where encounter took place:  [facility] [[id number]]    PRIMARY CARE PROVIDER AND CLINIC RESPONSIBLE AFTER TRANSFER:   Ana Thorpe PA-C, [facility] [address] / [place] [address]    [facility] Provider     Transferring providers: Tonya Lynn Haase, APRN CNP, Dr. Jasiel Vickers MD  Recent Hospitalization/ED:  [facility] stay 10/24/20 to 10/28/20.  Date of SNF Admission: October / 28 / 2020  Date of SNF (anticipated) Discharge: November / 11 / 2020  Discharged to: previous independent home  Cognitive Scores: BIMS: 15/15 and SBT 0/28.  Physical Function: Ambulating 150 ft. x2 ft with fww  DME: Walker    CODE STATUS/ADVANCE DIRECTIVES DISCUSSION:  Full Code   ALLERGIES: Patient has no known allergies.    DISCHARGE DIAGNOSIS/NURSING FACILITY COURSE:   Patient progressed to walking > 150 feet using 4ww indep, indep with ADL's, will DC home with no home services, patient will f/u with spine surgeon in 4 weeks.     Past Medical History:  has a past medical history of Anxiety, Benign paroxysmal positional vertigo, Blood in stool, Dysfunction of both eustachian tubes, Dyskinesia, Hair loss, Hyperlipidemia, Hypertension, Imbalance, Livedo reticularis, Mandibular swelling, Muscle cramps, Obese, Onychomycosis, Osteoporosis, Parkinson's disease (H), Polyclonal gammopathy, Sacroiliitis (H), Sensorineural hearing loss, Sinusitis, Sleep apnea, Thyroid disorder, Tongue symptom, Vitamin B6 deficiency, and Vitamin D deficiency.    Discharge Medications:    Current Outpatient Medications   Medication Sig Dispense Refill     acetaminophen (TYLENOL) 500 MG tablet Take 2 tablets (1,000 mg) by mouth 3 times daily       Amantadine HCl ER (GOCOVRI) 137 MG CP24  Take 2 tablets by mouth At Bedtime       carbidopa-levodopa (SINEMET CR)  MG CR tablet Take 1 tablet by mouth At Bedtime       carbidopa-levodopa (SINEMET)  MG tablet Take 2 tablets by mouth 2 times daily At 8 AM and 8 PM    (in addition to 12pm, and 4pm doses)        carbidopa-levodopa (SINEMET)  MG tablet Take 2.5 tablets by mouth 2 times daily At 12 PM and 4 PM    (in addition to 8am and 8pm doses)       entacapone (COMTAN) 200 MG tablet Take 200 mg by mouth 4 times daily At 8am, 12pm, 4pm, and 8pm       gabapentin (NEURONTIN) 300 MG capsule Take 300 mg by mouth 3 times daily At 8am, 4pm, 12am       hydrochlorothiazide (HYDRODIURIL) 12.5 MG tablet Take 12.5 mg by mouth every morning       melatonin 5 MG tablet Take 5 mg by mouth every evening       multivitamin, therapeutic (THERA-VIT) TABS tablet Take 1 tablet by mouth every morning       oxyCODONE (ROXICODONE) 5 MG tablet Take 1-2 tablets (5-10 mg) by mouth every 4 hours as needed for moderate to severe pain Take 1 tablet for pain level 3-6, 2 tablets for pain level 7-10. 40 tablet 0     polyethylene glycol (MIRALAX) 17 g packet Take 17 g by mouth 2 times daily for 14 days 28 packet 0     pyridOXINE (VITAMIN  B-6) 25 MG tablet Take 25 mg by mouth every morning       rosuvastatin (CRESTOR) 20 MG tablet Take 20 mg by mouth every evening       vitamin D2 (ERGOCALCIFEROL) 46351 units (1250 mcg) capsule Take 50,000 Units by mouth once a week On Tuesdays       vitamin D3 (CHOLECALCIFEROL) 50 mcg (2000 units) tablet Take 1 tablet by mouth every morning         Medication Changes/Rationale:     DC oxycodone patient no longer using    Controlled medications sent with patient:   not applicable/none     ROS:   10 point ROS of systems including Constitutional, Eyes, Respiratory, Cardiovascular, Gastroenterology, Genitourinary, Integumentary, Musculoskeletal, Psychiatric were all negative except for pertinent positives noted in my HPI.    Physical Exam:  "  Vitals: BP (!) 142/74   Pulse 79   Temp 97.4  F (36.3  C)   Resp 16   Ht 1.778 m (5' 10\")   Wt 104.3 kg (229 lb 14.4 oz)   SpO2 96%   BMI 32.99 kg/m    BMI= Body mass index is 32.99 kg/m .  GENERAL APPEARANCE:  Alert, in no distress  ENT:  Mouth and posterior oropharynx normal, moist mucous membranes, normal hearing acuity  EYES:  EOM, conjunctivae, lids, pupils and irises normal, PERRL  NECK:  .  RESP:  no respiratory distress  CV:  no edema  ABDOMEN:  abdomen round  M/S:   patient sitting up in chair, able to move all 4 extremities  SKIN:  Inspection of skin and subcutaneous tissue baseline  NEURO:   speech wnl  PSYCH:  affect and mood normal     SNF labs:   Most Recent 3 CBC's:  Recent Labs   Lab Test 11/02/20 10/27/20  0750 10/26/20  0745 10/25/20  0640   WBC  --  8.5 9.8 8.5   HGB 12.7 11.8 11.6* 11.5*   MCV  --  97 96 97   PLT  --  214 196 189     Most Recent 3 BMP's:  Recent Labs   Lab Test 11/02/20 10/27/20  0750 10/26/20  0745    139 137   POTASSIUM 4.4 3.7 3.9   CHLORIDE 104 106 105   CO2 25 27 28   BUN 10 16 14   CR 0.61 0.82 0.80   ANIONGAP 9 6 4   NORMA 10.2 9.7 9.3   * 97 114*     ASSESSMENT/PLAN:  Epidural hematoma (H)  S/P laminectomy  Acute/ongoing: s/p L4-5 Hemilaminectomy on 10/20/20 then post op epidural hematoma s/p revision of jazz-laminectomy L2-L4 with incidental durotomy 10/24/20 Dr. Lr   DC home no services  Continue Tylenol 1000mg q 6 hours prn  DC oxycodone, patient not using   neurontin 300mg TID,   F/u with spine surgery in 4 weeks     Drug-induced constipation  Acute/ongoing: senna s 1 PO BID prn, miralax to 17gm BID prn     Benign essential hypertension  Ongoing: continue HCTZ 12.5mg QD     Parkinson's disease (H)  Ongoing: no change in Rx     SHAHRAM (generalized anxiety disorder)  Ongoing: no change in Rx       DISCHARGE PLAN:    Follow up labs: No labs orders/due    Medical Follow Up:      Follow up with primary care provider in 1 weeks    MTM referral needed and " placed by this provider: No    Current [facility] [place] scheduled appointments:  Future Appointments   Date Time Provider Department Center   No future appts.       Discharge Services: Home Care:  no services    Discharge Instructions Verbalized to Patient at Discharge:     None      TOTAL DISCHARGE TIME:   Greater than 30 minutes  Electronically signed by:  Tonya Lynn Haase, APRN CNP                         Sincerely,        Tonya Lynn Haase, APRN CNP

## 2020-11-09 NOTE — PROGRESS NOTES
[place] [facility] DISCHARGE SUMMARY  PATIENT'S NAME: Teagan Rand  YOB: 1950  MEDICAL RECORD NUMBER:  [id number]  Place of Service where encounter took place:  [facility] [[id number]]    PRIMARY CARE PROVIDER AND CLINIC RESPONSIBLE AFTER TRANSFER:   Ana Thorpe PA-C, [facility] [address] [facility] [address] / [facility] [address]    [facility] Provider     Transferring providers: Tonya Lynn Haase, APRN CNP, Dr. Jasiel Vickers MD  Recent Hospitalization/ED:  [facility] to 10/28/20.  Date of SNF Admission: October / 28 / 2020  Date of SNF (anticipated) Discharge: November / 11 / 2020  Discharged to: previous independent home  Cognitive Scores: BIMS: 15/15 and SBT 0/28.  Physical Function: Ambulating 150 ft. x2 ft with fww  DME: Walker    CODE STATUS/ADVANCE DIRECTIVES DISCUSSION:  Full Code   ALLERGIES: Patient has no known allergies.    DISCHARGE DIAGNOSIS/NURSING FACILITY COURSE:   Patient progressed to walking > 150 feet using 4ww indep, indep with ADL's, will DC home with no home services, patient will f/u with spine surgeon in 4 weeks.     Past Medical History:  has a past medical history of Anxiety, Benign paroxysmal positional vertigo, Blood in stool, Dysfunction of both eustachian tubes, Dyskinesia, Hair loss, Hyperlipidemia, Hypertension, Imbalance, Livedo reticularis, Mandibular swelling, Muscle cramps, Obese, Onychomycosis, Osteoporosis, Parkinson's disease (H), Polyclonal gammopathy, Sacroiliitis (H), Sensorineural hearing loss, Sinusitis, Sleep apnea, Thyroid disorder, Tongue symptom, Vitamin B6 deficiency, and Vitamin D deficiency.    Discharge Medications:    Current Outpatient Medications   Medication Sig Dispense Refill     acetaminophen (TYLENOL) 500 MG tablet Take 2 tablets (1,000 mg) by mouth 3 times daily       Amantadine HCl ER (GOCOVRI) 137 MG CP24 Take 2 tablets by mouth At Bedtime       carbidopa-levodopa (SINEMET CR)   MG CR tablet Take 1 tablet by mouth At Bedtime       carbidopa-levodopa (SINEMET)  MG tablet Take 2 tablets by mouth 2 times daily At 8 AM and 8 PM    (in addition to 12pm, and 4pm doses)        carbidopa-levodopa (SINEMET)  MG tablet Take 2.5 tablets by mouth 2 times daily At 12 PM and 4 PM    (in addition to 8am and 8pm doses)       entacapone (COMTAN) 200 MG tablet Take 200 mg by mouth 4 times daily At 8am, 12pm, 4pm, and 8pm       gabapentin (NEURONTIN) 300 MG capsule Take 300 mg by mouth 3 times daily At 8am, 4pm, 12am       hydrochlorothiazide (HYDRODIURIL) 12.5 MG tablet Take 12.5 mg by mouth every morning       melatonin 5 MG tablet Take 5 mg by mouth every evening       multivitamin, therapeutic (THERA-VIT) TABS tablet Take 1 tablet by mouth every morning       oxyCODONE (ROXICODONE) 5 MG tablet Take 1-2 tablets (5-10 mg) by mouth every 4 hours as needed for moderate to severe pain Take 1 tablet for pain level 3-6, 2 tablets for pain level 7-10. 40 tablet 0     polyethylene glycol (MIRALAX) 17 g packet Take 17 g by mouth 2 times daily for 14 days 28 packet 0     pyridOXINE (VITAMIN  B-6) 25 MG tablet Take 25 mg by mouth every morning       rosuvastatin (CRESTOR) 20 MG tablet Take 20 mg by mouth every evening       vitamin D2 (ERGOCALCIFEROL) 99330 units (1250 mcg) capsule Take 50,000 Units by mouth once a week On Tuesdays       vitamin D3 (CHOLECALCIFEROL) 50 mcg (2000 units) tablet Take 1 tablet by mouth every morning         Medication Changes/Rationale:     DC oxycodone patient no longer using    Controlled medications sent with patient:   not applicable/none     ROS:   10 point ROS of systems including Constitutional, Eyes, Respiratory, Cardiovascular, Gastroenterology, Genitourinary, Integumentary, Musculoskeletal, Psychiatric were all negative except for pertinent positives noted in my HPI.    Physical Exam:   Vitals: BP (!) 142/74   Pulse 79   Temp 97.4  F (36.3  C)   Resp 16   Ht  "1.778 m (5' 10\")   Wt 104.3 kg (229 lb 14.4 oz)   SpO2 96%   BMI 32.99 kg/m    BMI= Body mass index is 32.99 kg/m .  GENERAL APPEARANCE:  Alert, in no distress  ENT:  Mouth and posterior oropharynx normal, moist mucous membranes, normal hearing acuity  EYES:  EOM, conjunctivae, lids, pupils and irises normal, PERRL  NECK:  .  RESP:  no respiratory distress  CV:  no edema  ABDOMEN:  abdomen round  M/S:   patient sitting up in chair, able to move all 4 extremities  SKIN:  Inspection of skin and subcutaneous tissue baseline  NEURO:   speech wnl  PSYCH:  affect and mood normal     SNF labs:   Most Recent 3 CBC's:  Recent Labs   Lab Test 11/02/20 10/27/20  0750 10/26/20  0745 10/25/20  0640   WBC  --  8.5 9.8 8.5   HGB 12.7 11.8 11.6* 11.5*   MCV  --  97 96 97   PLT  --  214 196 189     Most Recent 3 BMP's:  Recent Labs   Lab Test 11/02/20 10/27/20  0750 10/26/20  0745    139 137   POTASSIUM 4.4 3.7 3.9   CHLORIDE 104 106 105   CO2 25 27 28   BUN 10 16 14   CR 0.61 0.82 0.80   ANIONGAP 9 6 4   NORMA 10.2 9.7 9.3   * 97 114*     ASSESSMENT/PLAN:  Epidural hematoma (H)  S/P laminectomy  Acute/ongoing: s/p L4-5 Hemilaminectomy on 10/20/20 then post op epidural hematoma s/p revision of jazz-laminectomy L2-L4 with incidental durotomy 10/24/20 Dr. Lr   DC home no services  Continue Tylenol 1000mg q 6 hours prn  DC oxycodone, patient not using   neurontin 300mg TID,   F/u with spine surgery in 4 weeks     Drug-induced constipation  Acute/ongoing: senna s 1 PO BID prn, miralax to 17gm BID prn     Benign essential hypertension  Ongoing: continue HCTZ 12.5mg QD     Parkinson's disease (H)  Ongoing: no change in Rx     SHAHRAM (generalized anxiety disorder)  Ongoing: no change in Rx       DISCHARGE PLAN:    Follow up labs: No labs orders/due    Medical Follow Up:      Follow up with primary care provider in 1 weeks    MTM referral needed and placed by this provider: No    Current [place] scheduled appointments:  Future " Appointments   Date Time Provider Department Center   No future appts.       Discharge Services: Home Care:  no services    Discharge Instructions Verbalized to Patient at Discharge:     None      TOTAL DISCHARGE TIME:   Greater than 30 minutes  Electronically signed by:  Tonya Lynn Haase, APRN CNP

## (undated) DEVICE — TOOL DISSECT MIDAS MR8 12CM TELESC MATCH 2.5 MR8-T12MH25

## (undated) DEVICE — NDL SPINAL 18GA 3.5" 405184

## (undated) DEVICE — PACK SPINE SM CUSTOM SNE15SSFSK

## (undated) DEVICE — DRSG KERLIX FLUFFS X5

## (undated) DEVICE — GLOVE PROTEXIS W/NEU-THERA 8.5  2D73TE85

## (undated) DEVICE — DRSG STERI STRIP 1/2X4" R1547

## (undated) DEVICE — ADH SKIN CLOSURE PREMIERPRO EXOFIN MICOR HV 0.5ML 3471

## (undated) DEVICE — DRSG STERI STRIP 1X5" R1548

## (undated) DEVICE — DRAIN JACKSON PRATT 15FR ROUND SIL LF JP-2229

## (undated) DEVICE — ESU ELEC BLADE 2.75" COATED/INSULATED E1455

## (undated) DEVICE — POSITIONER PT PRONESAFE HEAD REST W/DERMAPROX INSERT 40599

## (undated) DEVICE — DRAIN JACKSON PRATT RESERVOIR 100ML SU130-1305

## (undated) DEVICE — SU STRATAFIX PDS PLUS 0 CT 45CM SXPP1A406

## (undated) DEVICE — ESU ELEC BLADE 6" COATED/INSULATED E1455-6

## (undated) DEVICE — GLOVE PROTEXIS W/NEU-THERA 7.0  2D73TE70

## (undated) DEVICE — SU VICRYL 0 CT-2 CR 8X18" J727D

## (undated) DEVICE — SUCTION CANISTER MEDIVAC LINER 3000ML W/LID 65651-530

## (undated) DEVICE — LINEN TOWEL PACK X5 5464

## (undated) DEVICE — SPONGE SURGIFOAM 100 1974

## (undated) DEVICE — DRAPE SHEET REV FOLD 3/4 9349

## (undated) DEVICE — ESU ELEC BLADE 4" COATED

## (undated) DEVICE — KIT SEALER DURASEAL EXACT SP HYDROGEL 5ML SGL USE 20-6520

## (undated) DEVICE — SU VICRYL 2-0 CT-2 27" UND J269H

## (undated) DEVICE — DRAPE COVER C-ARM SEAMLESS SNAP-KAP 03-KP26 LATEX FREE

## (undated) DEVICE — ESU GROUND PAD UNIVERSAL W/O CORD

## (undated) DEVICE — TUBING SUCTION MEDI-VAC SOFT 3/16"X20' N520A

## (undated) DEVICE — DRSG GAUZE 4X4" 8044

## (undated) DEVICE — RX SURGIFLO HEMOSTATIC MATRIX W/THROMBIN 8ML 2994

## (undated) DEVICE — GLOVE PROTEXIS BLUE W/NEU-THERA 7.5  2D73EB75

## (undated) DEVICE — DRAPE IOBAN INCISE 23X17" 6650EZ

## (undated) DEVICE — PREP CHLORAPREP 26ML TINTED ORANGE  260815

## (undated) DEVICE — TOOL DISSECT MIDAS MR8 14CM MATCH HEAD 3MM MR8-14MH30

## (undated) DEVICE — SOL ADH LIQUID BENZOIN SWAB 0.6ML C1544

## (undated) DEVICE — SPONGE COTTONOID 1X1" 80-1403

## (undated) DEVICE — SPONGE SURGIFOAM 12 1972

## (undated) DEVICE — DRSG TELFA ISLAND 4X10"

## (undated) DEVICE — SPONGE COTTONOID 1/2X3" 80-1407

## (undated) DEVICE — SUCTION FRAZIER 12FR W/HANDLE K73

## (undated) DEVICE — SU VICRYL 0 UR-6 27" J603H

## (undated) DEVICE — ESU PENCIL W/HOLSTER

## (undated) DEVICE — ESU PENCIL W/HOLSTER E2350H

## (undated) DEVICE — BONE WAX 2.5GM W31G

## (undated) DEVICE — DRAPE MICROSCOPE EQUIP 48X120" 6130VL2

## (undated) DEVICE — SU ETHILON 2-0 FS 18" 664H

## (undated) DEVICE — ESU CORD BIPOLAR GREEN 10-4000

## (undated) DEVICE — GLOVE PROTEXIS W/NEU-THERA 7.5  2D73TE75

## (undated) DEVICE — DRAIN JACKSON PRATT CHANNEL 10FR RND SIL W/TROCAR JP-2227

## (undated) DEVICE — SU VICRYL 2-0 CT-2 CR 8X18" J726D

## (undated) DEVICE — LIGHT HANDLE X2

## (undated) DEVICE — NDL ANGIOCATH 14GA 1.25" 4048

## (undated) DEVICE — SU MONOCRYL 3-0 SH 27" UND Y416H

## (undated) DEVICE — ESU CLEANER TIP 31142717

## (undated) RX ORDER — GLYCOPYRROLATE 0.2 MG/ML
INJECTION, SOLUTION INTRAMUSCULAR; INTRAVENOUS
Status: DISPENSED
Start: 2020-10-24

## (undated) RX ORDER — FENTANYL CITRATE 50 UG/ML
INJECTION, SOLUTION INTRAMUSCULAR; INTRAVENOUS
Status: DISPENSED
Start: 2020-10-21

## (undated) RX ORDER — HYDROMORPHONE HYDROCHLORIDE 1 MG/ML
INJECTION, SOLUTION INTRAMUSCULAR; INTRAVENOUS; SUBCUTANEOUS
Status: DISPENSED
Start: 2020-10-24

## (undated) RX ORDER — PROPOFOL 10 MG/ML
INJECTION, EMULSION INTRAVENOUS
Status: DISPENSED
Start: 2020-10-21

## (undated) RX ORDER — OXYCODONE HYDROCHLORIDE 5 MG/1
TABLET ORAL
Status: DISPENSED
Start: 2020-10-21

## (undated) RX ORDER — DEXAMETHASONE SODIUM PHOSPHATE 4 MG/ML
INJECTION, SOLUTION INTRA-ARTICULAR; INTRALESIONAL; INTRAMUSCULAR; INTRAVENOUS; SOFT TISSUE
Status: DISPENSED
Start: 2020-10-24

## (undated) RX ORDER — GABAPENTIN 100 MG/1
CAPSULE ORAL
Status: DISPENSED
Start: 2020-10-21

## (undated) RX ORDER — FENTANYL CITRATE 0.05 MG/ML
INJECTION, SOLUTION INTRAMUSCULAR; INTRAVENOUS
Status: DISPENSED
Start: 2020-10-24

## (undated) RX ORDER — HYDROMORPHONE HYDROCHLORIDE 1 MG/ML
INJECTION, SOLUTION INTRAMUSCULAR; INTRAVENOUS; SUBCUTANEOUS
Status: DISPENSED
Start: 2020-10-21

## (undated) RX ORDER — LIDOCAINE HYDROCHLORIDE 20 MG/ML
INJECTION, SOLUTION EPIDURAL; INFILTRATION; INTRACAUDAL; PERINEURAL
Status: DISPENSED
Start: 2020-10-24

## (undated) RX ORDER — BUPIVACAINE HYDROCHLORIDE AND EPINEPHRINE 5; 5 MG/ML; UG/ML
INJECTION, SOLUTION EPIDURAL; INTRACAUDAL; PERINEURAL
Status: DISPENSED
Start: 2020-10-24

## (undated) RX ORDER — NEOSTIGMINE METHYLSULFATE 1 MG/ML
VIAL (ML) INJECTION
Status: DISPENSED
Start: 2020-10-24

## (undated) RX ORDER — ONDANSETRON 2 MG/ML
INJECTION INTRAMUSCULAR; INTRAVENOUS
Status: DISPENSED
Start: 2020-10-24

## (undated) RX ORDER — ACETAMINOPHEN 325 MG/1
TABLET ORAL
Status: DISPENSED
Start: 2020-10-21

## (undated) RX ORDER — BUPIVACAINE HYDROCHLORIDE AND EPINEPHRINE 5; 5 MG/ML; UG/ML
INJECTION, SOLUTION EPIDURAL; INTRACAUDAL; PERINEURAL
Status: DISPENSED
Start: 2020-10-21

## (undated) RX ORDER — CEFAZOLIN SODIUM 1 G/3ML
INJECTION, POWDER, FOR SOLUTION INTRAMUSCULAR; INTRAVENOUS
Status: DISPENSED
Start: 2020-10-24

## (undated) RX ORDER — METHYLPREDNISOLONE ACETATE 40 MG/ML
INJECTION, SUSPENSION INTRA-ARTICULAR; INTRALESIONAL; INTRAMUSCULAR; SOFT TISSUE
Status: DISPENSED
Start: 2020-10-21

## (undated) RX ORDER — CEFAZOLIN SODIUM 2 G/100ML
INJECTION, SOLUTION INTRAVENOUS
Status: DISPENSED
Start: 2020-10-24

## (undated) RX ORDER — GINSENG 100 MG
CAPSULE ORAL
Status: DISPENSED
Start: 2020-10-24

## (undated) RX ORDER — FENTANYL CITRATE 50 UG/ML
INJECTION, SOLUTION INTRAMUSCULAR; INTRAVENOUS
Status: DISPENSED
Start: 2020-10-24

## (undated) RX ORDER — CEFAZOLIN SODIUM 2 G/100ML
INJECTION, SOLUTION INTRAVENOUS
Status: DISPENSED
Start: 2020-10-21